# Patient Record
Sex: MALE | Race: WHITE | NOT HISPANIC OR LATINO | Employment: FULL TIME | URBAN - METROPOLITAN AREA
[De-identification: names, ages, dates, MRNs, and addresses within clinical notes are randomized per-mention and may not be internally consistent; named-entity substitution may affect disease eponyms.]

---

## 2021-01-28 ENCOUNTER — HOSPITAL ENCOUNTER (EMERGENCY)
Facility: HOSPITAL | Age: 56
End: 2021-01-29
Attending: EMERGENCY MEDICINE | Admitting: EMERGENCY MEDICINE
Payer: COMMERCIAL

## 2021-01-28 ENCOUNTER — APPOINTMENT (EMERGENCY)
Dept: CT IMAGING | Facility: HOSPITAL | Age: 56
End: 2021-01-28
Payer: COMMERCIAL

## 2021-01-28 DIAGNOSIS — I25.9 CARDIAC ISCHEMIA: ICD-10-CM

## 2021-01-28 DIAGNOSIS — N17.9 AKI (ACUTE KIDNEY INJURY) (HCC): ICD-10-CM

## 2021-01-28 DIAGNOSIS — I26.99 BILATERAL PULMONARY EMBOLISM (HCC): Primary | ICD-10-CM

## 2021-01-28 DIAGNOSIS — D72.829 LEUKOCYTOSIS: ICD-10-CM

## 2021-01-28 DIAGNOSIS — R77.8 ELEVATED TROPONIN: ICD-10-CM

## 2021-01-28 LAB
ALBUMIN SERPL BCP-MCNC: 4.3 G/DL (ref 3.4–4.8)
ALP SERPL-CCNC: 84.7 U/L (ref 10–129)
ALT SERPL W P-5'-P-CCNC: 14 U/L (ref 5–63)
ANION GAP SERPL CALCULATED.3IONS-SCNC: 15 MMOL/L (ref 4–13)
APTT PPP: 26 SECONDS (ref 23–31)
AST SERPL W P-5'-P-CCNC: 16 U/L (ref 15–41)
BASOPHILS # BLD AUTO: 0.01 THOUSANDS/ΜL (ref 0–0.1)
BASOPHILS NFR BLD AUTO: 0 % (ref 0–1)
BILIRUB SERPL-MCNC: 1.15 MG/DL (ref 0.3–1.2)
BUN SERPL-MCNC: 19 MG/DL (ref 6–20)
CALCIUM SERPL-MCNC: 10.1 MG/DL (ref 8.4–10.2)
CHLORIDE SERPL-SCNC: 103 MMOL/L (ref 96–108)
CO2 SERPL-SCNC: 22 MMOL/L (ref 22–33)
CREAT SERPL-MCNC: 1.65 MG/DL (ref 0.5–1.2)
D DIMER PPP FEU-MCNC: 10.68 MG/L FEU (ref 0.19–0.49)
EOSINOPHIL # BLD AUTO: 0.05 THOUSAND/ΜL (ref 0–0.61)
EOSINOPHIL NFR BLD AUTO: 0 % (ref 0–6)
ERYTHROCYTE [DISTWIDTH] IN BLOOD BY AUTOMATED COUNT: 13.9 % (ref 11.6–15.1)
FLUAV RNA RESP QL NAA+PROBE: NEGATIVE
FLUBV RNA RESP QL NAA+PROBE: NEGATIVE
GFR SERPL CREATININE-BSD FRML MDRD: 45 ML/MIN/1.73SQ M
GLUCOSE SERPL-MCNC: 202 MG/DL (ref 65–140)
HCT VFR BLD AUTO: 47.4 % (ref 36.5–49.3)
HGB BLD-MCNC: 15.7 G/DL (ref 12–17)
IMM GRANULOCYTES # BLD AUTO: 0.05 THOUSAND/UL (ref 0–0.2)
IMM GRANULOCYTES NFR BLD AUTO: 0 % (ref 0–2)
LACTATE SERPL-SCNC: 2.1 MMOL/L (ref 0–2)
LACTATE SERPL-SCNC: 2.3 MMOL/L (ref 0–2)
LYMPHOCYTES # BLD AUTO: 2.18 THOUSANDS/ΜL (ref 0.6–4.47)
LYMPHOCYTES NFR BLD AUTO: 12 % (ref 14–44)
MAGNESIUM SERPL-MCNC: 2.2 MG/DL (ref 1.6–2.6)
MCH RBC QN AUTO: 28.3 PG (ref 26.8–34.3)
MCHC RBC AUTO-ENTMCNC: 33.1 G/DL (ref 31.4–37.4)
MCV RBC AUTO: 86 FL (ref 82–98)
MONOCYTES # BLD AUTO: 1.05 THOUSAND/ΜL (ref 0.17–1.22)
MONOCYTES NFR BLD AUTO: 6 % (ref 4–12)
NEUTROPHILS # BLD AUTO: 15.08 THOUSANDS/ΜL (ref 1.85–7.62)
NEUTS SEG NFR BLD AUTO: 82 % (ref 43–75)
PLATELET # BLD AUTO: 228 THOUSANDS/UL (ref 149–390)
PMV BLD AUTO: 10.3 FL (ref 8.9–12.7)
POTASSIUM SERPL-SCNC: 3.9 MMOL/L (ref 3.5–5)
PROT SERPL-MCNC: 8 G/DL (ref 6.4–8.3)
RBC # BLD AUTO: 5.54 MILLION/UL (ref 3.88–5.62)
RSV RNA RESP QL NAA+PROBE: NEGATIVE
SARS-COV-2 RNA RESP QL NAA+PROBE: NEGATIVE
SODIUM SERPL-SCNC: 140 MMOL/L (ref 133–145)
TROPONIN I SERPL-MCNC: 0.22 NG/ML (ref 0–0.07)
WBC # BLD AUTO: 18.42 THOUSAND/UL (ref 4.31–10.16)

## 2021-01-28 PROCEDURE — 71275 CT ANGIOGRAPHY CHEST: CPT

## 2021-01-28 PROCEDURE — 36415 COLL VENOUS BLD VENIPUNCTURE: CPT | Performed by: EMERGENCY MEDICINE

## 2021-01-28 PROCEDURE — 85379 FIBRIN DEGRADATION QUANT: CPT | Performed by: EMERGENCY MEDICINE

## 2021-01-28 PROCEDURE — 83605 ASSAY OF LACTIC ACID: CPT | Performed by: EMERGENCY MEDICINE

## 2021-01-28 PROCEDURE — 84484 ASSAY OF TROPONIN QUANT: CPT | Performed by: EMERGENCY MEDICINE

## 2021-01-28 PROCEDURE — 85730 THROMBOPLASTIN TIME PARTIAL: CPT | Performed by: EMERGENCY MEDICINE

## 2021-01-28 PROCEDURE — 93005 ELECTROCARDIOGRAM TRACING: CPT

## 2021-01-28 PROCEDURE — 80053 COMPREHEN METABOLIC PANEL: CPT | Performed by: EMERGENCY MEDICINE

## 2021-01-28 PROCEDURE — 85025 COMPLETE CBC W/AUTO DIFF WBC: CPT | Performed by: EMERGENCY MEDICINE

## 2021-01-28 PROCEDURE — 99285 EMERGENCY DEPT VISIT HI MDM: CPT

## 2021-01-28 PROCEDURE — 83735 ASSAY OF MAGNESIUM: CPT | Performed by: EMERGENCY MEDICINE

## 2021-01-28 PROCEDURE — 87040 BLOOD CULTURE FOR BACTERIA: CPT | Performed by: EMERGENCY MEDICINE

## 2021-01-28 PROCEDURE — 0241U HB NFCT DS VIR RESP RNA 4 TRGT: CPT | Performed by: EMERGENCY MEDICINE

## 2021-01-28 PROCEDURE — 99285 EMERGENCY DEPT VISIT HI MDM: CPT | Performed by: EMERGENCY MEDICINE

## 2021-01-28 PROCEDURE — 83036 HEMOGLOBIN GLYCOSYLATED A1C: CPT | Performed by: EMERGENCY MEDICINE

## 2021-01-28 PROCEDURE — 96361 HYDRATE IV INFUSION ADD-ON: CPT

## 2021-01-28 PROCEDURE — 96365 THER/PROPH/DIAG IV INF INIT: CPT

## 2021-01-28 RX ORDER — HEPARIN SODIUM 1000 [USP'U]/ML
4200 INJECTION, SOLUTION INTRAVENOUS; SUBCUTANEOUS
Status: DISCONTINUED | OUTPATIENT
Start: 2021-01-28 | End: 2021-01-29 | Stop reason: HOSPADM

## 2021-01-28 RX ORDER — HEPARIN SODIUM 1000 [USP'U]/ML
8000 INJECTION, SOLUTION INTRAVENOUS; SUBCUTANEOUS ONCE
Status: DISCONTINUED | OUTPATIENT
Start: 2021-01-28 | End: 2021-01-28

## 2021-01-28 RX ORDER — HEPARIN SODIUM 10000 [USP'U]/100ML
3-30 INJECTION, SOLUTION INTRAVENOUS
Status: DISCONTINUED | OUTPATIENT
Start: 2021-01-28 | End: 2021-01-29 | Stop reason: HOSPADM

## 2021-01-28 RX ORDER — HEPARIN SODIUM 1000 [USP'U]/ML
8400 INJECTION, SOLUTION INTRAVENOUS; SUBCUTANEOUS ONCE
Status: COMPLETED | OUTPATIENT
Start: 2021-01-28 | End: 2021-01-29

## 2021-01-28 RX ORDER — HEPARIN SODIUM 1000 [USP'U]/ML
8000 INJECTION, SOLUTION INTRAVENOUS; SUBCUTANEOUS
Status: DISCONTINUED | OUTPATIENT
Start: 2021-01-28 | End: 2021-01-28

## 2021-01-28 RX ORDER — HEPARIN SODIUM 1000 [USP'U]/ML
8400 INJECTION, SOLUTION INTRAVENOUS; SUBCUTANEOUS
Status: DISCONTINUED | OUTPATIENT
Start: 2021-01-28 | End: 2021-01-29 | Stop reason: HOSPADM

## 2021-01-28 RX ORDER — KETOROLAC TROMETHAMINE 30 MG/ML
30 INJECTION, SOLUTION INTRAMUSCULAR; INTRAVENOUS ONCE
Status: DISCONTINUED | OUTPATIENT
Start: 2021-01-28 | End: 2021-01-29 | Stop reason: HOSPADM

## 2021-01-28 RX ORDER — HEPARIN SODIUM 10000 [USP'U]/100ML
3-30 INJECTION, SOLUTION INTRAVENOUS
Status: DISCONTINUED | OUTPATIENT
Start: 2021-01-28 | End: 2021-01-28

## 2021-01-28 RX ORDER — CEFTRIAXONE 1 G/50ML
1000 INJECTION, SOLUTION INTRAVENOUS ONCE
Status: COMPLETED | OUTPATIENT
Start: 2021-01-28 | End: 2021-01-28

## 2021-01-28 RX ORDER — HEPARIN SODIUM 1000 [USP'U]/ML
4000 INJECTION, SOLUTION INTRAVENOUS; SUBCUTANEOUS
Status: DISCONTINUED | OUTPATIENT
Start: 2021-01-28 | End: 2021-01-28

## 2021-01-28 RX ADMIN — SODIUM CHLORIDE 2060 ML: 0.9 INJECTION, SOLUTION INTRAVENOUS at 20:21

## 2021-01-28 RX ADMIN — CEFTRIAXONE 1000 MG: 1 INJECTION, SOLUTION INTRAVENOUS at 20:36

## 2021-01-28 RX ADMIN — IOHEXOL 100 ML: 350 INJECTION, SOLUTION INTRAVENOUS at 21:50

## 2021-01-28 RX ADMIN — SODIUM CHLORIDE 1000 ML: 0.9 INJECTION, SOLUTION INTRAVENOUS at 19:46

## 2021-01-29 ENCOUNTER — HOSPITAL ENCOUNTER (INPATIENT)
Facility: HOSPITAL | Age: 56
LOS: 2 days | Discharge: HOME | DRG: 175 | End: 2021-01-31
Attending: INTERNAL MEDICINE | Admitting: INTERNAL MEDICINE
Payer: COMMERCIAL

## 2021-01-29 ENCOUNTER — APPOINTMENT (INPATIENT)
Dept: NON INVASIVE DIAGNOSTICS | Facility: HOSPITAL | Age: 56
DRG: 175 | End: 2021-01-29
Payer: COMMERCIAL

## 2021-01-29 VITALS
SYSTOLIC BLOOD PRESSURE: 127 MMHG | WEIGHT: 232 LBS | OXYGEN SATURATION: 98 % | DIASTOLIC BLOOD PRESSURE: 85 MMHG | HEART RATE: 91 BPM | RESPIRATION RATE: 18 BRPM | TEMPERATURE: 97.5 F

## 2021-01-29 DIAGNOSIS — I26.99 BILATERAL PULMONARY EMBOLISM (HCC): Primary | ICD-10-CM

## 2021-01-29 PROBLEM — R73.9 HYPERGLYCEMIA: Status: ACTIVE | Noted: 2021-01-29

## 2021-01-29 PROBLEM — R77.8 ELEVATED TROPONIN: Status: ACTIVE | Noted: 2021-01-29

## 2021-01-29 PROBLEM — N17.9 AKI (ACUTE KIDNEY INJURY) (HCC): Status: ACTIVE | Noted: 2021-01-29

## 2021-01-29 PROBLEM — I25.9 CARDIAC ISCHEMIA: Status: ACTIVE | Noted: 2021-01-29

## 2021-01-29 LAB
APTT PPP: 117 SECONDS (ref 23–37)
APTT PPP: >210 SECONDS (ref 23–37)
ATRIAL RATE: 92 BPM
ERYTHROCYTE [DISTWIDTH] IN BLOOD BY AUTOMATED COUNT: 13.9 % (ref 11.6–15.1)
EST. AVERAGE GLUCOSE BLD GHB EST-MCNC: 134 MG/DL
EST. AVERAGE GLUCOSE BLD GHB EST-MCNC: 134 MG/DL
HBA1C MFR BLD: 6.3 %
HBA1C MFR BLD: 6.3 %
HCT VFR BLD AUTO: 44.5 % (ref 36.5–49.3)
HGB BLD-MCNC: 14.4 G/DL (ref 12–17)
INR PPP: 1.35 (ref 0.84–1.19)
LACTATE SERPL-SCNC: 2.1 MMOL/L (ref 0.5–2)
LACTATE SERPL-SCNC: 2.7 MMOL/L (ref 0.5–2)
MCH RBC QN AUTO: 28.5 PG (ref 26.8–34.3)
MCHC RBC AUTO-ENTMCNC: 32.4 G/DL (ref 31.4–37.4)
MCV RBC AUTO: 88 FL (ref 82–98)
NT-PROBNP SERPL-MCNC: 7113 PG/ML
P AXIS: 76 DEGREES
PLATELET # BLD AUTO: 183 THOUSANDS/UL (ref 149–390)
PMV BLD AUTO: 10.2 FL (ref 8.9–12.7)
PR INTERVAL: 138 MS
PROTHROMBIN TIME: 16.4 SECONDS (ref 11.6–14.5)
QRS AXIS: 53 DEGREES
QRSD INTERVAL: 92 MS
QT INTERVAL: 404 MS
QTC INTERVAL: 500 MS
RBC # BLD AUTO: 5.06 MILLION/UL (ref 3.88–5.62)
T WAVE AXIS: -28 DEGREES
TROPONIN I SERPL-MCNC: 0.17 NG/ML
TROPONIN I SERPL-MCNC: 0.26 NG/ML
TROPONIN I SERPL-MCNC: 0.32 NG/ML
VENTRICULAR RATE: 92 BPM
WBC # BLD AUTO: 15.17 THOUSAND/UL (ref 4.31–10.16)

## 2021-01-29 PROCEDURE — 87040 BLOOD CULTURE FOR BACTERIA: CPT | Performed by: NURSE PRACTITIONER

## 2021-01-29 PROCEDURE — 99223 1ST HOSP IP/OBS HIGH 75: CPT | Performed by: INTERNAL MEDICINE

## 2021-01-29 PROCEDURE — 93306 TTE W/DOPPLER COMPLETE: CPT

## 2021-01-29 PROCEDURE — 85730 THROMBOPLASTIN TIME PARTIAL: CPT | Performed by: INTERNAL MEDICINE

## 2021-01-29 PROCEDURE — 96367 TX/PROPH/DG ADDL SEQ IV INF: CPT

## 2021-01-29 PROCEDURE — 85610 PROTHROMBIN TIME: CPT | Performed by: NURSE PRACTITIONER

## 2021-01-29 PROCEDURE — 96366 THER/PROPH/DIAG IV INF ADDON: CPT

## 2021-01-29 PROCEDURE — 83880 ASSAY OF NATRIURETIC PEPTIDE: CPT | Performed by: NURSE PRACTITIONER

## 2021-01-29 PROCEDURE — 83036 HEMOGLOBIN GLYCOSYLATED A1C: CPT | Performed by: NURSE PRACTITIONER

## 2021-01-29 PROCEDURE — 93306 TTE W/DOPPLER COMPLETE: CPT | Performed by: INTERNAL MEDICINE

## 2021-01-29 PROCEDURE — 93010 ELECTROCARDIOGRAM REPORT: CPT | Performed by: INTERNAL MEDICINE

## 2021-01-29 PROCEDURE — 85730 THROMBOPLASTIN TIME PARTIAL: CPT | Performed by: NURSE PRACTITIONER

## 2021-01-29 PROCEDURE — 96375 TX/PRO/DX INJ NEW DRUG ADDON: CPT

## 2021-01-29 PROCEDURE — 85027 COMPLETE CBC AUTOMATED: CPT | Performed by: NURSE PRACTITIONER

## 2021-01-29 PROCEDURE — 83605 ASSAY OF LACTIC ACID: CPT | Performed by: NURSE PRACTITIONER

## 2021-01-29 PROCEDURE — 93005 ELECTROCARDIOGRAM TRACING: CPT

## 2021-01-29 PROCEDURE — 84484 ASSAY OF TROPONIN QUANT: CPT | Performed by: NURSE PRACTITIONER

## 2021-01-29 RX ORDER — HEPARIN SODIUM 1000 [USP'U]/ML
4200 INJECTION, SOLUTION INTRAVENOUS; SUBCUTANEOUS
Status: DISCONTINUED | OUTPATIENT
Start: 2021-01-29 | End: 2021-01-29

## 2021-01-29 RX ORDER — HEPARIN SODIUM 1000 [USP'U]/ML
4000 INJECTION, SOLUTION INTRAVENOUS; SUBCUTANEOUS
Status: DISCONTINUED | OUTPATIENT
Start: 2021-01-29 | End: 2021-01-30

## 2021-01-29 RX ORDER — HEPARIN SODIUM 1000 [USP'U]/ML
8000 INJECTION, SOLUTION INTRAVENOUS; SUBCUTANEOUS ONCE
Status: DISCONTINUED | OUTPATIENT
Start: 2021-01-29 | End: 2021-01-30

## 2021-01-29 RX ORDER — HEPARIN SODIUM 1000 [USP'U]/ML
8400 INJECTION, SOLUTION INTRAVENOUS; SUBCUTANEOUS
Status: DISCONTINUED | OUTPATIENT
Start: 2021-01-29 | End: 2021-01-29

## 2021-01-29 RX ORDER — HEPARIN SODIUM 1000 [USP'U]/ML
8000 INJECTION, SOLUTION INTRAVENOUS; SUBCUTANEOUS
Status: DISCONTINUED | OUTPATIENT
Start: 2021-01-29 | End: 2021-01-30

## 2021-01-29 RX ORDER — HEPARIN SODIUM 1000 [USP'U]/ML
8400 INJECTION, SOLUTION INTRAVENOUS; SUBCUTANEOUS ONCE
Status: DISCONTINUED | OUTPATIENT
Start: 2021-01-29 | End: 2021-01-29

## 2021-01-29 RX ORDER — CHLORHEXIDINE GLUCONATE 0.12 MG/ML
15 RINSE ORAL EVERY 12 HOURS SCHEDULED
Status: DISCONTINUED | OUTPATIENT
Start: 2021-01-29 | End: 2021-01-29

## 2021-01-29 RX ORDER — HEPARIN SODIUM 10000 [USP'U]/100ML
3-30 INJECTION, SOLUTION INTRAVENOUS
Status: DISCONTINUED | OUTPATIENT
Start: 2021-01-29 | End: 2021-01-30

## 2021-01-29 RX ORDER — HEPARIN SODIUM 10000 [USP'U]/100ML
3-30 INJECTION, SOLUTION INTRAVENOUS
Status: DISCONTINUED | OUTPATIENT
Start: 2021-01-29 | End: 2021-01-29

## 2021-01-29 RX ADMIN — HEPARIN SODIUM 18 UNITS/KG/HR: 10000 INJECTION, SOLUTION INTRAVENOUS at 03:30

## 2021-01-29 RX ADMIN — HEPARIN SODIUM 18 UNITS/KG/HR: 10000 INJECTION, SOLUTION INTRAVENOUS at 00:16

## 2021-01-29 RX ADMIN — HEPARIN SODIUM 8400 UNITS: 1000 INJECTION INTRAVENOUS; SUBCUTANEOUS at 00:20

## 2021-01-29 RX ADMIN — PERFLUTREN 0.4 ML/MIN: 6.52 INJECTION, SUSPENSION INTRAVENOUS at 14:58

## 2021-01-29 NOTE — ED PROCEDURE NOTE
PROCEDURE  CriticalCare Time  Performed by: Jesusita Dean MD  Authorized by:  Jesusita Dena MD     Critical care provider statement:     Critical care time (minutes):  64    Critical care start time:  1/28/2021 7:10 PM    Critical care end time:  1/29/2021 1:45 AM    Critical care time was exclusive of:  Separately billable procedures and treating other patients    Critical care was necessary to treat or prevent imminent or life-threatening deterioration of the following conditions:  Respiratory failure and circulatory failure (bilateral large PEs with right heart straina nd elevated trop)    Critical care was time spent personally by me on the following activities:  Obtaining history from patient or surrogate, development of treatment plan with patient or surrogate, discussions with consultants, evaluation of patient's response to treatment, examination of patient, ordering and performing treatments and interventions, ordering and review of laboratory studies, ordering and review of radiographic studies and re-evaluation of patient's condition    I assumed direction of critical care for this patient from another provider in my specialty: no           Jesusita Dean MD  01/29/21 6074

## 2021-01-29 NOTE — ED PROCEDURE NOTE
PROCEDURE  ECG 12 Lead Documentation Only    Date/Time: 1/28/2021 11:50 PM  Performed by: Agustin Macedo MD  Authorized by:  Agustin Macedo MD     Indications / Diagnosis:  Pulomary emboli  ECG reviewed by me, the ED Provider: yes    Patient location:  ED and bedside  Previous ECG:     Previous ECG:  Unavailable    Comparison to cardiac monitor: Yes    Interpretation:     Interpretation: abnormal    Rate:     ECG rate:  89    ECG rate assessment: normal    Rhythm:     Rhythm: sinus rhythm    Ectopy:     Ectopy: none    QRS:     QRS axis:  Normal    QRS intervals:  Normal  Conduction:     Conduction: normal    ST segments:     ST segments:  Normal  T waves:     T waves: inverted      Inverted:  II, III, aVF, V1, V2, V3, V4, V5 and V6  Comments:      Ischemia in lateral leads         Agustin Macedo MD  01/29/21 7131

## 2021-01-29 NOTE — ED PROVIDER NOTES
History  Chief Complaint   Patient presents with    Shortness of Breath     sob since yesterday, "cold sweats" last night  cough that started this morning  denies fever  +covid exposure  This is a 61year old male that ambulated to the ED reporting that he has had several positive exposures in the past several weeks at work - he developed a dry cough yesterday with shortness of breath that began today  - denies wheezing or chest pain  Denies fever   Did experience night sweats and myalgias  last evening  Denies loss of taste or smell however has had a decreased appetite for the past 3 days  Also reports that he has not been drinking adequate fluids for the past 3 days    Smooth hx of asthma or COPD - denies hx of smoking  Later in the visit and after diagnosis, pt informed me that he became dizzy when he stood to transfer to the CT table and also that he did not go to work today due to his shortness of breath and that he had been experiencing palpitations intermittently throughout the day          Prior to Admission Medications   Prescriptions Last Dose Informant Patient Reported? Taking? Ascorbic Acid (VITAMIN C PO)   Yes Yes   Sig: Take by mouth   VITAMIN D PO   Yes Yes   Sig: Take by mouth      Facility-Administered Medications: None       History reviewed  No pertinent past medical history  History reviewed  No pertinent surgical history  History reviewed  No pertinent family history  I have reviewed and agree with the history as documented  E-Cigarette/Vaping     E-Cigarette/Vaping Substances     Social History     Tobacco Use    Smoking status: Never Smoker   Substance Use Topics    Alcohol use: Yes     Comment: occasionally    Drug use: Never       Review of Systems   Constitutional: Negative for activity change, appetite change, chills, diaphoresis, fatigue, fever and unexpected weight change     HENT: Negative for congestion, ear discharge, ear pain, mouth sores, sinus pressure, sinus pain, sneezing, sore throat, trouble swallowing and voice change  Eyes: Negative for photophobia, pain, discharge, redness, itching and visual disturbance  Respiratory: Positive for cough and shortness of breath  Negative for chest tightness  Cardiovascular: Negative for chest pain, palpitations and leg swelling  Gastrointestinal: Negative for abdominal pain, constipation, nausea and vomiting  Endocrine: Negative for cold intolerance, heat intolerance, polydipsia, polyphagia and polyuria  Genitourinary: Negative for decreased urine volume, difficulty urinating, dysuria, frequency, hematuria and urgency  Musculoskeletal: Positive for myalgias  Negative for arthralgias, back pain, gait problem, joint swelling, neck pain and neck stiffness  Skin: Negative for color change and rash  Allergic/Immunologic: Negative for immunocompromised state  Neurological: Negative for dizziness, tremors, seizures, syncope, speech difficulty, weakness, light-headedness, numbness and headaches  Hematological: Does not bruise/bleed easily  Psychiatric/Behavioral: Negative for behavioral problems and suicidal ideas  Physical Exam  Physical Exam  Vitals signs and nursing note reviewed  Constitutional:       General: He is not in acute distress  Appearance: Normal appearance  He is well-developed and normal weight  He is not ill-appearing, toxic-appearing or diaphoretic  HENT:      Head: Normocephalic and atraumatic  Right Ear: Tympanic membrane, ear canal and external ear normal  There is no impacted cerumen  Left Ear: Tympanic membrane, ear canal and external ear normal  There is no impacted cerumen  Nose: No congestion or rhinorrhea  Mouth/Throat:      Mouth: Mucous membranes are moist       Pharynx: Oropharynx is clear  No oropharyngeal exudate or posterior oropharyngeal erythema  Eyes:      General: No scleral icterus  Right eye: No discharge           Left eye: No discharge  Extraocular Movements: Extraocular movements intact  Conjunctiva/sclera: Conjunctivae normal       Pupils: Pupils are equal, round, and reactive to light  Neck:      Musculoskeletal: Normal range of motion and neck supple  No neck rigidity or muscular tenderness  Vascular: No JVD  Trachea: No tracheal deviation  Cardiovascular:      Rate and Rhythm: Regular rhythm  Tachycardia present  Heart sounds: Normal heart sounds  No murmur  Pulmonary:      Effort: Pulmonary effort is normal  Tachypnea present  No respiratory distress  Breath sounds: Examination of the right-upper field reveals decreased breath sounds  Examination of the left-upper field reveals decreased breath sounds  Examination of the right-middle field reveals decreased breath sounds  Examination of the left-middle field reveals decreased breath sounds  Examination of the right-lower field reveals decreased breath sounds  Examination of the left-lower field reveals decreased breath sounds  Decreased breath sounds present  No wheezing or rales  Chest:      Chest wall: No tenderness  Abdominal:      General: Bowel sounds are normal       Palpations: Abdomen is soft  There is no mass  Tenderness: There is no abdominal tenderness  There is no right CVA tenderness, left CVA tenderness or guarding  Hernia: No hernia is present  Musculoskeletal: Normal range of motion  General: No swelling, tenderness, deformity or signs of injury  Right lower leg: No edema  Left lower leg: No edema  Lymphadenopathy:      Cervical: No cervical adenopathy  Skin:     General: Skin is warm and dry  Capillary Refill: Capillary refill takes less than 2 seconds  Findings: No bruising, erythema or rash  Neurological:      General: No focal deficit present  Mental Status: He is alert and oriented to person, place, and time  Mental status is at baseline        Cranial Nerves: No cranial nerve deficit  Sensory: No sensory deficit  Motor: No weakness or abnormal muscle tone  Coordination: Coordination normal       Gait: Gait normal       Deep Tendon Reflexes: Reflexes normal    Psychiatric:         Mood and Affect: Mood normal          Behavior: Behavior normal          Thought Content:  Thought content normal          Judgment: Judgment normal          Vital Signs  ED Triage Vitals   Temperature Pulse Respirations Blood Pressure SpO2   01/28/21 1918 01/28/21 1915 01/28/21 1915 01/28/21 1915 01/28/21 1915   97 5 °F (36 4 °C) (!) 110 18 130/84 98 %      Temp Source Heart Rate Source Patient Position - Orthostatic VS BP Location FiO2 (%)   01/28/21 1918 01/28/21 1915 01/28/21 1915 01/28/21 1915 --   Oral Monitor Lying Right arm       Pain Score       01/28/21 2038       No Pain           Vitals:    01/28/21 1915 01/28/21 2038 01/28/21 2233 01/29/21 0022   BP: 130/84 109/74 126/84 103/74   Pulse: (!) 110 93 100 91   Patient Position - Orthostatic VS: Lying Lying Lying Lying         Visual Acuity      ED Medications  Medications   ketorolac (TORADOL) injection 30 mg (30 mg Intravenous Not Given 1/28/21 1948)   heparin (porcine) 25,000 units in 0 45% NaCl 250 mL infusion (premix) (18 Units/kg/hr × 105 kg (Order-Specific) Intravenous New Bag 1/29/21 0016)   heparin (porcine) injection 8,400 Units (has no administration in time range)   heparin (porcine) injection 4,200 Units (has no administration in time range)   sodium chloride 0 9 % bolus 1,000 mL (0 mL Intravenous Stopped 1/28/21 2138)   sodium chloride 0 9 % bolus 2,060 mL (0 mL Intravenous Stopped 1/28/21 2234)   cefTRIAXone (ROCEPHIN) IVPB (premix in dextrose) 1,000 mg 50 mL (0 mg Intravenous Stopped 1/28/21 2138)   iohexol (OMNIPAQUE) 350 MG/ML injection (SINGLE-DOSE) 100 mL (100 mL Intravenous Given 1/28/21 2150)   heparin (porcine) injection 8,400 Units (8,400 Units Intravenous Given 1/29/21 0020)       Diagnostic Studies  Results Reviewed     Procedure Component Value Units Date/Time    Hemoglobin A1C [[id number]] Collected: 01/28/21 1947    Lab Status: In process Specimen: Blood from Arm, Right Updated: 01/29/21 0056    Blood culture #1 [[id number]] Collected: 01/28/21 2029    Lab Status: Preliminary result Specimen: Blood from Arm, Left Updated: 01/29/21 0002     Blood Culture Received in Microbiology Lab  Culture in Progress  Blood culture #2 [[id number]] Collected: 01/28/21 2029    Lab Status: Preliminary result Specimen: Blood from Arm, Right Updated: 01/29/21 0002     Blood Culture Received in Microbiology Lab  Culture in Progress  Troponin I [[id number]]     Lab Status: No result Specimen: Blood     Troponin I [[id number]]  (Abnormal) Collected: 01/28/21 1947    Lab Status: Final result Specimen: Blood from Arm, Right Updated: 01/28/21 2341     Troponin I 0 22 ng/mL     APTT [[id number]]     Lab Status: No result Specimen: Blood     APTT [[id number]]  (Normal) Collected: 01/28/21 1947    Lab Status: Final result Specimen: Blood from Arm, Right Updated: 01/28/21 2320     PTT 26 seconds     Lactic acid 2 Hours [[id number]]  (Abnormal) Collected: 01/28/21 2231    Lab Status: Final result Specimen: Blood from Arm, Right Updated: 01/28/21 2255     LACTIC ACID 2 1 mmol/L     Narrative:      Result may be elevated if tourniquet was used during collection  Lactic acid, plasma [[id number]]  (Abnormal) Collected: 01/28/21 2029    Lab Status: Final result Specimen: Blood from Arm, Left Updated: 01/28/21 2057     LACTIC ACID 2 3 mmol/L     Narrative:      Result may be elevated if tourniquet was used during collection  COVID19, Influenza A/B, RSV PCR, [facility] [[id number]]  (Normal) Collected: 01/28/21 1947    Lab Status: Final result Specimen: Nasopharyngeal Swab Updated: 01/28/21 2044     SARS-CoV-2 Negative     INFLUENZA A PCR Negative     INFLUENZA B PCR Negative     RSV PCR Negative    Narrative:        This test has been authorized by FDA under an EUA (Emergency Use Assay) for use by authorized laboratories  Clinical caution and judgement should be used with the interpretation of these results with consideration of the clinical impression and other laboratory testing  Testing reported as "Positive" or "Negative" has been proven to be accurate according to standard laboratory validation requirements  All testing is performed with control materials showing appropriate reactivity at standard intervals      D-dimer, quantitative [[id number]]  (Abnormal) Collected: 01/28/21 1947    Lab Status: Final result Specimen: Blood from Arm, Right Updated: 01/28/21 2035     D-Dimer, Quant  10 68 mg/L FEU     Comprehensive metabolic panel [[id number]]  (Abnormal) Collected: 01/28/21 1947    Lab Status: Final result Specimen: Blood from Arm, Right Updated: 01/28/21 2014     Sodium 140 mmol/L      Potassium 3 9 mmol/L      Chloride 103 mmol/L      CO2 22 mmol/L      ANION GAP 15 mmol/L      BUN 19 mg/dL      Creatinine 1 65 mg/dL      Glucose 202 mg/dL      Calcium 10 1 mg/dL      AST 16 U/L      ALT 14 U/L      Alkaline Phosphatase 84 7 U/L      Total Protein 8 0 g/dL      Albumin 4 3 g/dL      Total Bilirubin 1 15 mg/dL      eGFR 45 ml/min/1 73sq m     Narrative:      [place] guidelines for Chronic Kidney Disease (CKD):     Stage 1 with normal or high GFR (GFR > 90 mL/min/1 73 square meters)    Stage 2 Mild CKD (GFR = 60-89 mL/min/1 73 square meters)    Stage 3A Moderate CKD (GFR = 45-59 mL/min/1 73 square meters)    Stage 3B Moderate CKD (GFR = 30-44 mL/min/1 73 square meters)    Stage 4 Severe CKD (GFR = 15-29 mL/min/1 73 square meters)    Stage 5 End Stage CKD (GFR <15 mL/min/1 73 square meters)  Note: GFR calculation is accurate only with a steady state creatinine    Magnesium [[id number]]  (Normal) Collected: 01/28/21 1947    Lab Status: Final result Specimen: Blood from Arm, Right Updated: 01/28/21 2014     Magnesium 2 2 mg/dL CBC and differential [[id number]]  (Abnormal) Collected: 01/28/21 1947    Lab Status: Final result Specimen: Blood from Arm, Right Updated: 01/28/21 2000     WBC 18 42 Thousand/uL      RBC 5 54 Million/uL      Hemoglobin 15 7 g/dL      Hematocrit 47 4 %      MCV 86 fL      MCH 28 3 pg      MCHC 33 1 g/dL      RDW 13 9 %      MPV 10 3 fL      Platelets 742 Thousands/uL      Neutrophils Relative 82 %      Immat GRANS % 0 %      Lymphocytes Relative 12 %      Monocytes Relative 6 %      Eosinophils Relative 0 %      Basophils Relative 0 %      Neutrophils Absolute 15 08 Thousands/µL      Immature Grans Absolute 0 05 Thousand/uL      Lymphocytes Absolute 2 18 Thousands/µL      Monocytes Absolute 1 05 Thousand/µL      Eosinophils Absolute 0 05 Thousand/µL      Basophils Absolute 0 01 Thousands/µL                  CTA ED chest PE Study   Final Result by Marciano Ellis MD (01/28 2257)      Extensive bilateral upper and lower lobar pulmonary emboli  Right ventricle is dilated consistent with right heart strain  I personally discussed this study with REJI Pimentel on 1/28/2021 at 10:56 PM                Workstation performed: [id number]                    Procedures  Procedures         ED Course  ED Course as of Jan 29 0152   Thu Jan 28, 2021   2300 Received a call from radiologist advising that pt has bilateral extensive PEs with right heart strain 0-ordered heparin drip        Fri Jan 29, 2021   0027 Through the assistance of Cristal Eng at [address] [place] [address] findings with Dr Salbador Meneses at [facility] - advised that pt is stable and may be transferred to [facility] ICU - awaiting call from ICU at [address] denies dizziness PTA however reports that when he stood up to transfer to the CT table, he became dizzy and "thought he may pass out"      267 This 78-year-old male presented to emergency department with complaints of shortness of breath and a dry cough that started yesterday  He has had substantial COVID contacts  Was concern for COVID, URI, pneumonia or PE due to his tachycardia and tachypnea  0291 Notified by radiologist of bilateral large Pes with right heart strain  Ischemia on the EKG with T wave inversions  Initial trop 0 22  Leukocytosis at 18 42 - triggered for sepsis with tachycardia and leukocytosis - initial lactate elevated at 2 3  Ordered septic calculated fluids and Rocephin for probable pulonary source  Heparin bolus and drip ordered after receiving CTA results  TANG with increased creatinine at 1 65  Glucose elevated at 202 - added A1C      0054 Pt not a diagnosed diabetic      0054 Discussed diagnosis with pt and wife at the bedside and there need to transfer to a higher level of care - verbalized agreement with the plan  Discussed with Dr Aristides Boo [facility] and Dr Oleg Rivera [facility] - pt accepted for stepdown transfer to [facility]  Portions of the record may have been created with voice recognition software  Occasional wrong word or "sound a like" substitutions may have occurred due to the inherent limitations of voice recognition software  Read the chart carefully and recognize, using context, where substitutions have occurred  0146 Spent 32 minutes in total discussing findings and treatment plans with patient and his wife  Discussed case and findings with intensivists from 2 different campuses on different calls    Received notification from Criselda at [facility] that pt will be picked up by [facility] at [address] for transport to the ICU at [facility] - pt and wifr advised as to the same      See sepsis reassessment                                  Default Flowsheet Data (last 720 hours)      Sepsis Reassess     Row Name 01/29/21 0142                   Repeat Volume Status and Tissue Perfusion Assessment Performed    Repeat Volume Status and Tissue Perfusion Assessment Performed  Yes  -TM           Volume Status and Tissue Perfusion Post Fluid Resuscitation * Must Document All *    Vital Signs Reviewed (HR, RR, BP, T)  Yes 103/74,97 5,91, 95% on room air  -TM        Shock Index Reviewed  Yes  -TM        Arterial Oxygen Saturation Reviewed (POx, SaO2 or SpO2)  Yes (comment %) 95  -TM        Cardio  Normal S1/S2; Regular rate and rhythm; No murmor; No rub or gallop  -TM        Pulmonary  Normal effort; Other (comment) decreased bilateral air movement  -TM        Capillary Refill  Brisk  -TM        Peripheral Pulses  Radial;Dorsalis Pedis; Posterior Tibialis  -TM        Peripheral Pulse  +2  -TM        Dorsalis Pedis  +2  -TM        Posterior Tibialis  +2  -TM        Skin  Warm;Dry;Normal  -TM        Urine output assessed  Adequate  -TM           *OR*   Intensive Monitoring- Must Document One of the Following Four *:    Vital Signs Reviewed          * Central Venous Pressure (CVP or RAP)          * Central Venous Oxygen (SVO2, ScvO2 or Oxygen saturation via central catheter)          * Bedside Cardiovascular US in IVC diameter and % collapse          * Passive Leg Raise OR Crystalloid Challenge            User Key  (r) = Recorded By, (t) = Taken By, (c) = Cosigned By    Initials Name Provider Type    TM Kole Butler MD Physician            Ed Chacon' Criteria for PE      Most Recent Value   Wells' Criteria for PE   Clinical signs and symptoms of DVT  0 Filed at: 01/28/2021 2049   PE is primary diagnosis or equally likely  3 Filed at: 01/28/2021 2049   HR >100  1 5 Filed at: 01/28/2021 2049   Immobilization at least 3 days or Surgery in the previous 4 weeks  1 5 Filed at: 01/28/2021 2049   Previous, objectively diagnosed PE or DVT  0 Filed at: 01/28/2021 2049   Hemoptysis  0 Filed at: 01/28/2021 2049   Malignancy with treatment within 6 months or palliative  0 Filed at: 01/28/2021 2049   Ed Chacon' Criteria Total  6 Filed at: 01/28/2021 2049                MDM  Number of Diagnoses or Management Options  Bilateral pulmonary embolism ([facility] [address] ): new and requires workup  Cardiac ischemia: new and requires workup  Elevated troponin: new and requires workup  Leukocytosis: new and requires workup  Diagnosis management comments: COVID,viral URI, bronchitis  PE,MI       Amount and/or Complexity of Data Reviewed  Clinical lab tests: ordered and reviewed  Tests in the radiology section of CPT®: ordered and reviewed  Obtain history from someone other than the patient: yes (Wife at bedside after negative COVID test)  Discuss the patient with other providers: yes (Radiologist, Dr Carrie Owens [facility])  Independent visualization of images, tracings, or specimens: yes    Risk of Complications, Morbidity, and/or Mortality  Presenting problems: high  Diagnostic procedures: moderate  Management options: high    Patient Progress  Patient progress: stable      Disposition  Final diagnoses:   Bilateral pulmonary embolism (HCC)   Leukocytosis   Elevated troponin   Cardiac ischemia   TANG (acute kidney injury) ([facility] [address] )     Time reflects when diagnosis was documented in both MDM as applicable and the Disposition within this note     Time User Action Codes Description Comment    1/28/2021 11:07 PM Geannie Mauro Add [I26 99] Bilateral pulmonary embolism ([facility] [address] )     1/28/2021 11:07 PM Geannie [place] Add [D72 829] Leukocytosis     1/29/2021 12:31 AM Geannie Mauro Add [R77 8] Elevated troponin     1/29/2021 12:32 AM Geannie [place] Add [I25 9] Cardiac ischemia     1/29/2021  1:03 AM Geannie [place] Add [N17 9] TANG (acute kidney injury) [facility])       ED Disposition     ED Disposition Condition Date/Time Comment    Transfer to Another Facility-In Network  Fri Jan 29, 2021  1:00 AM Annettefrederick Number should be transferred out to [facility]        MD Documentation      Most Recent Value   Patient Condition  The patient has been stabilized such that within reasonable medical probability, no material deterioration of the patient condition or the condition of the unborn child(lady) is likely to result from the transfer   Reason for Transfer  Level of Care needed not available at this facility   Benefits of Transfer  Specialized equipment and/or services available at the receiving facility (Include comment)________________________   Risks of Transfer  Potential for delay in receiving treatment   Accepting Physician  Dr Blas Shin Name, [facility]    (Name & Tel number)  Delilah Gonzalez -    Sending MD Dr Hood Campoverde   Provider Certification  General risk, such as traffic hazards, adverse weather conditions, rough terrain or turbulence, possible failure of equipment (including vehicle or aircraft), or consequences of actions of persons outside the control of the transport personnel, Unanticipated needs of medical equipment and personnel during transport, Risk of worsening condition, The possibility of a transport vehicle being unavailable      RN Documentation      Most Recent Value   Accepting Facility [facility], [facility]   Bed Assignment  ICU 14    (Name & Tel number)  [facility] -       Follow-up Information    None         Patient's Medications   Discharge Prescriptions    No medications on file     No discharge procedures on file      PDMP Review     None          ED Provider  Electronically Signed by           Rosas Blevins MD  01/29/21 3141

## 2021-01-29 NOTE — SEPSIS NOTE
Sepsis Note   Geovanny Franco 61 y o  male MRN: [id number]  Unit/Bed#: ED 06 Encounter: [id number]      qSOFA     [address] Name 01/29/21 0022 01/28/21 2233 01/28/21 2038 01/28/21 1923 01/28/21 1915    Altered mental status GCS < 15        0      Respiratory Rate > / =22  0  0  0    0    Systolic BP < / =058  0  0  0    0    Q Sofa Score  0  0  0  0  0                 Default Flowsheet Data (last 720 hours)      Sepsis Reassess     Row Name 01/29/21 0142                   Repeat Volume Status and Tissue Perfusion Assessment Performed    Repeat Volume Status and Tissue Perfusion Assessment Performed  Yes  -TM           Volume Status and Tissue Perfusion Post Fluid Resuscitation * Must Document All *    Vital Signs Reviewed (HR, RR, BP, T)  Yes 103/74,97 5,91, 95% on room air  -TM        Shock Index Reviewed  Yes  -TM        Arterial Oxygen Saturation Reviewed (POx, SaO2 or SpO2)  Yes (comment %) 95  -TM        Cardio  Normal S1/S2; Regular rate and rhythm; No murmor; No rub or gallop  -TM        Pulmonary  Normal effort; Other (comment) decreased bilateral air movement  -TM        Capillary Refill  Brisk  -TM        Peripheral Pulses  Radial;Dorsalis Pedis; Posterior Tibialis  -TM        Peripheral Pulse  +2  -TM        Dorsalis Pedis  +2  -TM        Posterior Tibialis  +2  -TM        Skin  Warm;Dry;Normal  -TM        Urine output assessed  Adequate  -TM           *OR*   Intensive Monitoring- Must Document One of the Following Four *:    Vital Signs Reviewed          * Central Venous Pressure (CVP or RAP)          * Central Venous Oxygen (SVO2, ScvO2 or Oxygen saturation via central catheter)          * Bedside Cardiovascular US in IVC diameter and % collapse          * Passive Leg Raise OR Crystalloid Challenge            User Key  (r) = Recorded By, (t) = Taken By, (c) = Cosigned By    Initials Name Provider Type    TM Luis Paris MD Physician

## 2021-01-29 NOTE — ASSESSMENT & PLAN NOTE
Likely secondary to pulmonary embolism  Will trend troponins  Patient is currently without chest pain

## 2021-01-29 NOTE — PLAN OF CARE
Problem: Potential for Falls  Goal: Patient will remain free of falls  Description: INTERVENTIONS:  - Assess patient frequently for physical needs  -  Identify cognitive and physical deficits and behaviors that affect risk of falls    -  [place] fall precautions as indicated by assessment   - Educate patient/family on patient safety including physical limitations  - Instruct patient to call for assistance with activity based on assessment  - Modify environment to reduce risk of injury  - Consider OT/PT consult to assist with strengthening/mobility  Outcome: Progressing     Problem: Prexisting or High Potential for Compromised Skin Integrity  Goal: Skin integrity is maintained or improved  Description: INTERVENTIONS:  - Identify patients at risk for skin breakdown  - Assess and monitor skin integrity  - Assess and monitor nutrition and hydration status  - Monitor labs   - Assess for incontinence   - Turn and reposition patient  - Assist with mobility/ambulation  - Relieve pressure over bony prominences  - Avoid friction and shearing  - Provide appropriate hygiene as needed including keeping skin clean and dry  - Evaluate need for skin moisturizer/barrier cream  - Collaborate with interdisciplinary team   - Patient/family teaching  - Consider wound care consult   Outcome: Progressing     Problem: NEUROSENSORY - ADULT  Goal: Achieves stable or improved neurological status  Description: INTERVENTIONS  - Monitor and report changes in neurological status  - Monitor vital signs such as temperature, blood pressure, glucose, and any other labs ordered   - Initiate measures to prevent increased intracranial pressure  - Monitor for seizure activity and implement precautions if appropriate      Outcome: Progressing  Goal: Achieves maximal functionality and self care  Description: INTERVENTIONS  - Monitor swallowing and airway patency with patient fatigue and changes in neurological status  - Encourage and assist patient to increase activity and self care     - Encourage visually impaired, hearing impaired and aphasic patients to use assistive/communication devices  Outcome: Progressing     Problem: CARDIOVASCULAR - ADULT  Goal: Maintains optimal cardiac output and hemodynamic stability  Description: INTERVENTIONS:  - Monitor I/O, vital signs and rhythm  - Monitor for S/S and trends of decreased cardiac output  - Administer and titrate ordered vasoactive medications to optimize hemodynamic stability  - Assess quality of pulses, skin color and temperature  - Assess for signs of decreased coronary artery perfusion  - Instruct patient to report change in severity of symptoms  Outcome: Not Progressing  Goal: Absence of cardiac dysrhythmias or at baseline rhythm  Description: INTERVENTIONS:  - Continuous cardiac monitoring, vital signs, obtain 12 lead EKG if ordered  - Administer antiarrhythmic and heart rate control medications as ordered  - Monitor electrolytes and administer replacement therapy as ordered  Outcome: Progressing     Problem: RESPIRATORY - ADULT  Goal: Achieves optimal ventilation and oxygenation  Description: INTERVENTIONS:  - Assess for changes in respiratory status  - Assess for changes in mentation and behavior  - Position to facilitate oxygenation and minimize respiratory effort  - Oxygen administered by appropriate delivery if ordered  - Initiate smoking cessation education as indicated  - Encourage broncho-pulmonary hygiene including cough, deep breathe, Incentive Spirometry  - Assess the need for suctioning and aspirate as needed  - Assess and instruct to report SOB or any respiratory difficulty  - Respiratory Therapy support as indicated  Outcome: Progressing     Problem: GASTROINTESTINAL - ADULT  Goal: Minimal or absence of nausea and/or vomiting  Description: INTERVENTIONS:  - Administer IV fluids if ordered to ensure adequate hydration  - Maintain NPO status until nausea and vomiting are resolved  - Nasogastric tube if ordered  - Administer ordered antiemetic medications as needed  - Provide nonpharmacologic comfort measures as appropriate  - Advance diet as tolerated, if ordered  - Consider nutrition services referral to assist patient with adequate nutrition and appropriate food choices  Outcome: Progressing  Goal: Maintains or returns to baseline bowel function  Description: INTERVENTIONS:  - Assess bowel function  - Encourage oral fluids to ensure adequate hydration  - Administer IV fluids if ordered to ensure adequate hydration  - Administer ordered medications as needed  - Encourage mobilization and activity  - Consider nutritional services referral to assist patient with adequate nutrition and appropriate food choices  Outcome: Progressing  Goal: Maintains adequate nutritional intake  Description: INTERVENTIONS:  - Monitor percentage of each meal consumed  - Identify factors contributing to decreased intake, treat as appropriate  - Assist with meals as needed  - Monitor I&O, weight, and lab values if indicated  - Obtain nutrition services referral as needed  Outcome: Progressing     Problem: GENITOURINARY - ADULT  Goal: Maintains or returns to baseline urinary function  Description: INTERVENTIONS:  - Assess urinary function  - Encourage oral fluids to ensure adequate hydration if ordered  - Administer IV fluids as ordered to ensure adequate hydration  - Administer ordered medications as needed  - Offer frequent toileting  - Follow urinary retention protocol if ordered  Outcome: Progressing  Goal: Absence of urinary retention  Description: INTERVENTIONS:  - Assess patients ability to void and empty bladder  - Monitor I/O  - Bladder scan as needed  - Discuss with physician/AP medications to alleviate retention as needed  - Discuss catheterization for long term situations as appropriate  Outcome: Progressing     Problem: METABOLIC, FLUID AND ELECTROLYTES - ADULT  Goal: Electrolytes maintained within normal limits  Description: INTERVENTIONS:  - Monitor labs and assess patient for signs and symptoms of electrolyte imbalances  - Administer electrolyte replacement as ordered  - Monitor response to electrolyte replacements, including repeat lab results as appropriate  - Instruct patient on fluid and nutrition as appropriate  Outcome: Progressing  Goal: Fluid balance maintained  Description: INTERVENTIONS:  - Monitor labs   - Monitor I/O and WT  - Instruct patient on fluid and nutrition as appropriate  - Assess for signs & symptoms of volume excess or deficit  Outcome: Progressing  Goal: Glucose maintained within target range  Description: INTERVENTIONS:  - Monitor Blood Glucose as ordered  - Assess for signs and symptoms of hyperglycemia and hypoglycemia  - Administer ordered medications to maintain glucose within target range  - Assess nutritional intake and initiate nutrition service referral as needed  Outcome: Progressing     Problem: SKIN/TISSUE INTEGRITY - ADULT  Goal: Skin integrity remains intact  Description: INTERVENTIONS  - Identify patients at risk for skin breakdown  - Assess and monitor skin integrity  - Assess and monitor nutrition and hydration status  - Monitor labs (i e  albumin)  - Assess for incontinence   - Turn and reposition patient  - Assist with mobility/ambulation  - Relieve pressure over bony prominences  - Avoid friction and shearing  - Provide appropriate hygiene as needed including keeping skin clean and dry  - Evaluate need for skin moisturizer/barrier cream  - Collaborate with interdisciplinary team (i e  Nutrition, Rehabilitation, etc )   - Patient/family teaching  Outcome: Progressing  Goal: Oral mucous membranes remain intact  Description: INTERVENTIONS  - Assess oral mucosa and hygiene practices  - Implement preventative oral hygiene regimen  - Implement oral medicated treatments as ordered  - Initiate Nutrition services referral as needed  Outcome: Progressing     Problem: HEMATOLOGIC - ADULT  Goal: Maintains hematologic stability  Description: INTERVENTIONS  - Assess for signs and symptoms of bleeding or hemorrhage  - Monitor labs  - Administer supportive blood products/factors as ordered and appropriate  Outcome: Not Progressing     Problem: MUSCULOSKELETAL - ADULT  Goal: Maintain or return mobility to safest level of function  Description: INTERVENTIONS:  - Assess patient's ability to carry out ADLs; assess patient's baseline for ADL function and identify physical deficits which impact ability to perform ADLs (bathing, care of mouth/teeth, toileting, grooming, dressing, etc )  - Assess/evaluate cause of self-care deficits   - Assess range of motion  - Assess patient's mobility  - Assess patient's need for assistive devices and provide as appropriate  - Encourage maximum independence but intervene and supervise when necessary  - Involve family in performance of ADLs  - Assess for home care needs following discharge   - Consider OT consult to assist with ADL evaluation and planning for discharge  - Provide patient education as appropriate  Outcome: Progressing  Goal: Maintain proper alignment of affected body part  Description: INTERVENTIONS:  - Support, maintain and protect limb and body alignment  - Provide patient/ family with appropriate education  Outcome: Progressing

## 2021-01-29 NOTE — ASSESSMENT & PLAN NOTE
Unprovoked as evidenced by findings of extensive upper and lower lobe pulmonary emboli with RV strain on CTA  Patient reports near syncopal episode last evening with 2 day history of not feeling well, pleurisy, difficulty breathing, palpitations    Patient denies all risk factors  Oxygenating at 97% on [facility]  Maintain oxygen saturations>92%  Heparin infusion per VTE/PE protocol  Cautious fluid administration if needed

## 2021-01-29 NOTE — EMTALA/ACUTE CARE TRANSFER
[facility] EMERGENCY DEPARTMENT  [address] [facility] [address]  Dept: [phone]      EMTALA TRANSFER CONSENT    NAME Anjum Wilkerson                                         1965                              MRN [id number]    I have been informed of my rights regarding examination, treatment, and transfer   by Dr Poli Thomas MD    Benefits: Specialized equipment and/or services available at the receiving facility (Include comment)________________________Intensivist    Risks: Potential for delay in receiving treatment      Consent for Transfer:  I acknowledge that my medical condition has been evaluated and explained to me by the emergency department physician or other qualified medical person and/or my attending physician, who has recommended that I be transferred to the service of  Accepting Physician: Dr Myrtle Downs at [address] Name, [address] : [address] [facility]  The above potential benefits of such transfer, the potential risks associated with such transfer, and the probable risks of not being transferred have been explained to me, and I fully understand them  The doctor has explained that, in my case, the benefits of transfer outweigh the risks  I agree to be transferred  I authorize the performance of emergency medical procedures and treatments upon me in both transit and upon arrival at the receiving facility  Additionally, I authorize the release of any and all medical records to the receiving facility and request they be transported with me, if possible  I understand that the safest mode of transportation during a medical emergency is an ambulance and that the Hospital advocates the use of this mode of transport  Risks of traveling to the receiving facility by car, including absence of medical control, life sustaining equipment, such as oxygen, and medical personnel has been explained to me and I fully understand them      ([place] BELOW)  [ x ]  I consent to the stated transfer and to be transported by ambulance/helicopter  [  ]  I consent to the stated transfer, but refuse transportation by ambulance and accept full responsibility for my transportation by car  I understand the risks of non-ambulance transfers and I exonerate the Hospital and its staff from any deterioration in my condition that results from this refusal     X___________________________________________    DATE  21  TIME________  Signature of patient or legally responsible individual signing on patient behalf           RELATIONSHIP TO PATIENT_________________________          Provider Certification    NAME Tone Patel                                         1965                              MRN [id number]    A medical screening exam was performed on the above named patient  Based on the examination:    Condition Necessitating Transfer The primary encounter diagnosis was Bilateral pulmonary embolism ([address] )  Diagnoses of Leukocytosis, Elevated troponin, and Cardiac ischemia were also pertinent to this visit      Patient Condition: The patient has been stabilized such that within reasonable medical probability, no material deterioration of the patient condition or the condition of the unborn child(lady) is likely to result from the transfer    Reason for Transfer: Level of Care needed not available at this facility    Transfer Requirements: [address] [address]   · Space available and qualified personnel available for treatment as acknowledged by PACS Carlyn Ny -   · Agreed to accept transfer and to provide appropriate medical treatment as acknowledged by       Dr Shivani Chow  · Appropriate medical records of the examination and treatment of the patient are provided at the time of transfer   [address], [address] _______  · Transfer will be performed by qualified personnel from    and appropriate transfer equipment as required, including the use of necessary and appropriate life support measures  Provider Certification: I have examined the patient and explained the following risks and benefits of being transferred/refusing transfer to the patient/family:  General risk, such as traffic hazards, adverse weather conditions, rough terrain or turbulence, possible failure of equipment (including vehicle or aircraft), or consequences of actions of persons outside the control of the transport personnel, Unanticipated needs of medical equipment and personnel during transport, Risk of worsening condition, The possibility of a transport vehicle being unavailable      Based on these reasonable risks and benefits to the patient and/or the unborn child(lady), and based upon the information available at the time of the patients examination, I certify that the medical benefits reasonably to be expected from the provision of appropriate medical treatments at another medical facility outweigh the increasing risks, if any, to the individuals medical condition, and in the case of labor to the unborn child, from effecting the transfer      X____________________________________________ DATE 01/29/21        TIME_______      ORIGINAL - SEND TO MEDICAL RECORDS   COPY - SEND WITH PATIENT DURING TRANSFER

## 2021-01-29 NOTE — H&P
H&P- Reinaldo Toth Duglas Carlos 1965, 61 y o  male MRN: [id number]    Unit/Bed#: ICU 14 Encounter: [id number]    Primary Care Provider: No primary care provider on file  Date and time admitted to hospital: 1/29/2021  2:59 AM        Bilateral pulmonary embolism (HCC)  Assessment & Plan  Unprovoked as evidenced by findings of extensive upper and lower lobe pulmonary emboli with RV strain on CTA  Patient reports near syncopal episode last evening with 2 day history of not feeling well, pleurisy, difficulty breathing, palpitations  Patient denies all risk factors  Oxygenating at 97% on [facility]  Maintain oxygen saturations>92%  Heparin infusion per VTE/PE protocol  Cautious fluid administration if needed        Hyperglycemia  Assessment & Plan  Will check HA1C  Regular diet  Monitor glucose with am chemistry    TANG (acute kidney injury) ([facility] [address] )  Assessment & Plan  Creatinine 1 65 on admission, unknown baseline  Patient denies history  Monitor renal indices and urine output  Avoid nephrotoxins    Elevated troponin  Assessment & Plan  Likely secondary to pulmonary embolism  Will trend troponins  Patient is currently without chest pain    Leukocytosis  Assessment & Plan  May be inflammatory, denies any s/s of infection  Monitor for wbc, temps      -------------------------------------------------------------------------------------------------------------  Chief Complaint: dyspnea, near syncope    History of Present Illness   HX and PE limited by: Brody Noriega is a 61 y o  male with no PMH who presents with near syncopal episode last evening and a 2 day history of not feeling well, night sweats pleurisy, dyspnea, palpitations  Patient reports that he thought he had COVID which is what prompted his ED visit  Upon admission to [place] ED, a CTA revealed bilateral upper and lower lobe pulmonary embolism with right heart strain  He was placed on a heparin infusion and transferred to [facility] for high level of care   He was also treated for sepsis at [facility]  Patient reported that he had contacts with co-workers that tested positive for Francesca  His last contact with them was 2 weeks ago  History obtained from chart review and the patient   -------------------------------------------------------------------------------------------------------------  Dispo: Admit to Stepdown Level 1    Code Status: Level 1 - Full Code  --------------------------------------------------------------------------------------------------------------  Review of Systems   Constitutional: Positive for diaphoresis  HENT: Negative  Eyes: Negative  Respiratory: Positive for shortness of breath  Cardiovascular: Positive for chest pain and palpitations  Gastrointestinal: Negative  Endocrine: Negative  Genitourinary: Negative  Musculoskeletal: Negative  Skin: Negative  Allergic/Immunologic: Negative  Neurological: Positive for dizziness, weakness and light-headedness  Hematological: Negative  Psychiatric/Behavioral: Negative  A 12-point, complete review of systems was reviewed and negative except as stated above     Physical Exam  Vitals signs and nursing note reviewed  Constitutional:       General: He is not in acute distress  Appearance: Normal appearance  HENT:      Head: Normocephalic and atraumatic  Right Ear: External ear normal       Left Ear: External ear normal       Nose: Nose normal       Mouth/Throat:      Mouth: Mucous membranes are moist       Pharynx: Oropharynx is clear  Eyes:      Extraocular Movements: Extraocular movements intact  Conjunctiva/sclera: Conjunctivae normal       Pupils: Pupils are equal, round, and reactive to light  Neck:      Musculoskeletal: Normal range of motion and neck supple  Cardiovascular:      Rate and Rhythm: Normal rate and regular rhythm  Pulses: Normal pulses  Heart sounds: Normal heart sounds     Pulmonary:      Effort: Pulmonary effort is normal       Breath sounds: Normal breath sounds  Abdominal:      General: Bowel sounds are normal       Palpations: Abdomen is soft  Musculoskeletal: Normal range of motion  Right lower leg: No edema  Left lower leg: No edema  Skin:     General: Skin is warm and dry  Capillary Refill: Capillary refill takes less than 2 seconds  Neurological:      General: No focal deficit present  Mental Status: He is alert and oriented to person, place, and time  Psychiatric:         Mood and Affect: Mood normal          Behavior: Behavior normal          Thought Content: Thought content normal          Judgment: Judgment normal        --------------------------------------------------------------------------------------------------------------  Vitals:   Vitals:    01/29/21 0308   Weight: 105 kg (231 lb 7 7 oz)     Temp  Min: 97 5 °F (36 4 °C)  Max: 97 5 °F (36 4 °C)  IBW: -88 kg     There is no height or weight on file to calculate BMI      Laboratory and Diagnostics:  Results from last 7 days   Lab Units 01/28/21 1947   WBC Thousand/uL 18 42*   HEMOGLOBIN g/dL 15 7   HEMATOCRIT % 47 4   PLATELETS Thousands/uL 228   NEUTROS PCT % 82*   MONOS PCT % 6     Results from last 7 days   Lab Units 01/28/21 1947   SODIUM mmol/L 140   POTASSIUM mmol/L 3 9   CHLORIDE mmol/L 103   CO2 mmol/L 22   ANION GAP mmol/L 15*   BUN mg/dL 19   CREATININE mg/dL 1 65*   CALCIUM mg/dL 10 1   GLUCOSE RANDOM mg/dL 202*   ALT U/L 14   AST U/L 16   ALK PHOS U/L 84 7   ALBUMIN g/dL 4 3   TOTAL BILIRUBIN mg/dL 1 15     Results from last 7 days   Lab Units 01/28/21 1947   MAGNESIUM mg/dL 2 2      Results from last 7 days   Lab Units 01/28/21 1947   PTT seconds 26      Results from last 7 days   Lab Units 01/28/21 1947   TROPONIN I ng/mL 0 22*     Results from last 7 days   Lab Units 01/28/21 2231 01/28/21 2029   LACTIC ACID mmol/L 2 1* 2 3*     ABG:    VBG:          Micro:  Results from last 7 days   Lab Units 01/28/21 2029 BLOOD CULTURE  Received in Microbiology Lab  Culture in Progress  Received in Microbiology Lab  Culture in Progress  EKG: NSR  Imaging: I have personally reviewed pertinent reports  Historical Information   History reviewed  No pertinent past medical history  History reviewed  No pertinent surgical history  Social History   Social History     Substance and Sexual Activity   Alcohol Use Yes    Comment: occasionally     Social History     Substance and Sexual Activity   Drug Use Never     Social History     Tobacco Use   Smoking Status Never Smoker     Exercise History: n/a  Family History:   History reviewed  No pertinent family history  I have reviewed this patient's family history and commented on sigificant items within the HPI      Medications:  Current Facility-Administered Medications   Medication Dose Route Frequency    chlorhexidine (PERIDEX) 0 12 % oral rinse 15 mL  15 mL Swish & Spit Q12H [address]     Home medications:  Prior to Admission Medications   Prescriptions Last Dose Informant Patient Reported? Taking? Ascorbic Acid (VITAMIN C PO)   Yes No   Sig: Take by mouth   VITAMIN D PO   Yes No   Sig: Take by mouth      Facility-Administered Medications: None     Allergies:  No Known Allergies    ------------------------------------------------------------------------------------------------------------  Advance Directive and Living Will:      Power of :    POLST:    ------------------------------------------------------------------------------------------------------------  Anticipated Length of Stay is > 2 midnights    Care Time Delivered:   No Critical Care time spent       JULISA Prasad        Portions of the record may have been created with voice recognition software  Occasional wrong word or "sound a like" substitutions may have occurred due to the inherent limitations of voice recognition software    Read the chart carefully and recognize, using context, where substitutions have occurred

## 2021-01-30 LAB
ANION GAP SERPL CALCULATED.3IONS-SCNC: 14 MMOL/L (ref 4–13)
APTT PPP: 68 SECONDS (ref 23–37)
APTT PPP: 69 SECONDS (ref 23–37)
BASOPHILS # BLD AUTO: 0.05 THOUSANDS/ΜL (ref 0–0.1)
BASOPHILS NFR BLD AUTO: 0 % (ref 0–1)
BUN SERPL-MCNC: 17 MG/DL (ref 5–25)
CALCIUM SERPL-MCNC: 9.4 MG/DL (ref 8.3–10.1)
CHLORIDE SERPL-SCNC: 109 MMOL/L (ref 100–108)
CO2 SERPL-SCNC: 19 MMOL/L (ref 21–32)
CREAT SERPL-MCNC: 1.01 MG/DL (ref 0.6–1.3)
EOSINOPHIL # BLD AUTO: 0.84 THOUSAND/ΜL (ref 0–0.61)
EOSINOPHIL NFR BLD AUTO: 6 % (ref 0–6)
ERYTHROCYTE [DISTWIDTH] IN BLOOD BY AUTOMATED COUNT: 13.8 % (ref 11.6–15.1)
GFR SERPL CREATININE-BSD FRML MDRD: 82 ML/MIN/1.73SQ M
GLUCOSE SERPL-MCNC: 134 MG/DL (ref 65–140)
HCT VFR BLD AUTO: 42 % (ref 36.5–49.3)
HGB BLD-MCNC: 13.4 G/DL (ref 12–17)
IMM GRANULOCYTES # BLD AUTO: 0.05 THOUSAND/UL (ref 0–0.2)
IMM GRANULOCYTES NFR BLD AUTO: 0 % (ref 0–2)
LYMPHOCYTES # BLD AUTO: 3.52 THOUSANDS/ΜL (ref 0.6–4.47)
LYMPHOCYTES NFR BLD AUTO: 27 % (ref 14–44)
MCH RBC QN AUTO: 28.7 PG (ref 26.8–34.3)
MCHC RBC AUTO-ENTMCNC: 31.9 G/DL (ref 31.4–37.4)
MCV RBC AUTO: 90 FL (ref 82–98)
MONOCYTES # BLD AUTO: 0.92 THOUSAND/ΜL (ref 0.17–1.22)
MONOCYTES NFR BLD AUTO: 7 % (ref 4–12)
NEUTROPHILS # BLD AUTO: 7.65 THOUSANDS/ΜL (ref 1.85–7.62)
NEUTS SEG NFR BLD AUTO: 60 % (ref 43–75)
NRBC BLD AUTO-RTO: 0 /100 WBCS
PLATELET # BLD AUTO: 153 THOUSANDS/UL (ref 149–390)
PMV BLD AUTO: 10.5 FL (ref 8.9–12.7)
POTASSIUM SERPL-SCNC: 3.9 MMOL/L (ref 3.5–5.3)
RBC # BLD AUTO: 4.67 MILLION/UL (ref 3.88–5.62)
SODIUM SERPL-SCNC: 142 MMOL/L (ref 136–145)
WBC # BLD AUTO: 13.03 THOUSAND/UL (ref 4.31–10.16)

## 2021-01-30 PROCEDURE — 85730 THROMBOPLASTIN TIME PARTIAL: CPT | Performed by: NURSE PRACTITIONER

## 2021-01-30 PROCEDURE — 85025 COMPLETE CBC W/AUTO DIFF WBC: CPT | Performed by: NURSE PRACTITIONER

## 2021-01-30 PROCEDURE — 80048 BASIC METABOLIC PNL TOTAL CA: CPT | Performed by: NURSE PRACTITIONER

## 2021-01-30 PROCEDURE — 99232 SBSQ HOSP IP/OBS MODERATE 35: CPT | Performed by: INTERNAL MEDICINE

## 2021-01-30 RX ADMIN — APIXABAN 10 MG: 5 TABLET, FILM COATED ORAL at 21:09

## 2021-01-30 RX ADMIN — HEPARIN SODIUM 12 UNITS/KG/HR: 10000 INJECTION, SOLUTION INTRAVENOUS at 16:52

## 2021-01-30 NOTE — ASSESSMENT & PLAN NOTE
Unprovoked as evidenced by findings of extensive upper and lower lobe pulmonary emboli with RV strain on CTA  Patient reports near syncopal episode last evening with 2 day history of not feeling well, pleurisy, difficulty breathing, palpitations    Patient denies provoking factors; family history of colon cancer  Encouraged to seek outpatient screening  Oxygenating well on room air  Maintain oxygen saturations>92%  Heparin infusion per VTE/PE protocol  Cautious fluid administration if needed

## 2021-01-30 NOTE — UTILIZATION REVIEW
Initial Clinical Review    Admission: Date/Time/Statement:   Admission Orders (From admission, onward)     Ordered        01/29/21 0301  Inpatient Admission  Once                   Orders Placed This Encounter   Procedures    Inpatient Admission     Standing Status:   Standing     Number of Occurrences:   1     Order Specific Question:   Level of Care     Answer:   Level 1 Stepdown [13]     Order Specific Question:   Estimated length of stay     Answer:   More than 2 Midnights     Order Specific Question:   Certification     Answer:   I certify that inpatient services are medically necessary for this patient for a duration of greater than two midnights  See H&P and MD Progress Notes for additional information about the patient's course of treatment  Assessment/Plan: 60 yo m w/no pmhx transferred by EMS from [address] [place] [address] ED to [address] [place] [address] ICU, admitted as inpatient due to bilat PE's, acute hypoxic resp failure, and TANG  Presented to [facility] w/2 days of feeling unwell, night sweats, dyspnea, palpitations, pleurisy  Workup revealed tachypneic, tachycardic, decareased breath sounds throughout, acute submiassive bilateral upper/lower PE and R heart strain w/leukocytosis & lactic acidosis  Pt also had contact with COVID positive person 2 weeks prior  Heparin gtt started and decision made to transfer to [facility] for higher level of care  On arrival, lungs clear, 97% on 2 liters, also in ATNG with elevated trops  Avoiding nephrotoxins  Continue heparin gtt  1/30: resp failure, lactic acidosis, TANG, leukocytosis improving  PE's were unprovoked  Lungs remain clear, now on room air, heparin gtt in progress  Trop trending down, peak was   32 no chest pain  He was hyperglycemic with a1c 6 3, carb controlled diet ordered  Echo showed severe R heart strain  Transfer to med surg today and keep in house for at least one more night       ED Triage Vitals   Temperature Pulse Respirations Blood Pressure SpO2   01/29/21 0400 01/29/21 0400 01/29/21 0400 01/29/21 0400 01/29/21 0400   98 1 °F (36 7 °C) 90 12 100/75 96 %      Temp Source Heart Rate Source Patient Position - Orthostatic VS BP Location FiO2 (%)   01/29/21 0400 01/29/21 0400 01/29/21 0400 01/29/21 0400 --   Tympanic Monitor Lying Left arm       Pain Score       01/29/21 0402       No Pain          Wt Readings from Last 1 Encounters:   01/30/21 104 kg (229 lb 4 5 oz)     Additional Vital Signs:   Date/Time  Temp  Pulse  Resp BP MAP   SpO2  O2 Device    01/30/21 0900  --  80  22 126/80 100  95 %  --    01/30/21 0800  --  90  26Abnormal  123/73 88  94 %  None (Room air)    01/30/21 0700  98 1  90  25Abnormal  124/74 89  94 %  --    01/30/21 0400  98 3 °F (36 8 °C)  --  -- 146/80 --  --  --    01/29/21 2300  98 2 °F (36 8 °C)  --  -- -- --  --  --    01/29/21 1900  97 1 °F (36 2 °C)Abnormal   --  -- -- --  --  --    01/29/21 1800  --  96  17 140/77 83  95 %  --    01/29/21 1600  --  78  16 118/76 88  97 %  --    01/29/21 1521  97 7 °F (36 5 °C)  --  -- -- --  --  --    01/29/21 1200  --  92  15 113/78 97  96 %  --    01/29/21 1037  97 8 °F (36 6 °C)  --  -- -- --  --  --    01/29/21 1000  --  80  15 96/76 85  96 %  --    01/29/21 0900  --  86  17 102/79 91  96 %  --    01/29/21 0800  --  80  13 105/79 87  97 %  --    01/29/21 0701  97 8 °F (36 6 °C)  --  -- -- --  --  --    01/29/21 0700  --  86  16 100/80 91  95 %  --    01/29/21 0600  --  84  12 95/82 88  95 %  --    01/29/21 0500  --  84  15 98/80 89  95 %  --        Pertinent Labs/Diagnostic Test Results:   1/28 PE study (done at [facility])    Extensive bilateral upper and lower lobar pulmonary emboli     Right ventricle is dilated consistent with right heart strain      1/29 EKG Normal sinus rhythm  RSR' or QR pattern in V1 suggests right ventricular conduction delay  ST & T wave abnormality, consider inferior ischemia  ST & T wave abnormality, consider anterolateral ischemia  Prolonged QT    1/29 echo: LEFT VENTRICLE:  The left ventricle was not well seen but appeared to have normal systolic function  Left ventricular size was normal  Left ventricular wall thickness was normal  No regional wall motion abnormalities were noted but could not be ruled out      RIGHT VENTRICLE:  The right ventricle was significantly enlarged and hypokinetic  The right ventricular free wall appeared near akinetic  There was better contractility at the apex consistent with Alvares's sign but not completely classical since apical  contractility was also reduced      RIGHT ATRIUM:  Right atrial enlargement      MITRAL VALVE:  Mild mitral regurgitation      AORTIC VALVE:  No aortic stenosis or regurgitation      TRICUSPID VALVE:  There was moderate regurgitation      PULMONIC VALVE:  There was trace regurgitation      PULMONARY ARTERIES:  Severe pulmonary hypertension  Pulmonary artery systolic pressure is estimated 78 mmHg    Results from last 7 days   Lab Units 01/28/21 1947   SARS-COV-2  Negative     Results from last 7 days   Lab Units 01/30/21  0514 01/29/21 0318 01/28/21 1947   WBC Thousand/uL 13 03* 15 17* 18 42*   HEMOGLOBIN g/dL 13 4 14 4 15 7   HEMATOCRIT % 42 0 44 5 47 4   PLATELETS Thousands/uL 153 183 228   NEUTROS ABS Thousands/µL 7 65*  --  15 08*         Results from last 7 days   Lab Units 01/30/21  0514 01/28/21 1947   SODIUM mmol/L 142 140   POTASSIUM mmol/L 3 9 3 9   CHLORIDE mmol/L 109* 103   CO2 mmol/L 19* 22   ANION GAP mmol/L 14* 15*   BUN mg/dL 17 19   CREATININE mg/dL 1 01 1 65*   EGFR ml/min/1 73sq m 82 45   CALCIUM mg/dL 9 4 10 1   MAGNESIUM mg/dL  --  2 2     Results from last 7 days   Lab Units 01/28/21 1947   AST U/L 16   ALT U/L 14   ALK PHOS U/L 84 7   TOTAL PROTEIN g/dL 8 0   ALBUMIN g/dL 4 3   TOTAL BILIRUBIN mg/dL 1 15         Results from last 7 days   Lab Units 01/30/21  0514 01/28/21 1947   GLUCOSE RANDOM mg/dL 134 202*         Results from last 7 days   Lab Units 01/29/21 0318 01/28/21 1947   HEMOGLOBIN A1C % 6  3* 6 3*   EAG mg/dl 134 134       Results from last 7 days   Lab Units 01/29/21  0858 01/29/21  0616 01/29/21  0319 01/28/21 1947   TROPONIN I ng/mL 0 17* 0 26* 0 32* 0 22*     Results from last 7 days   Lab Units 01/28/21 1947   D-DIMER QUANTITATIVE mg/L FEU 10 68*     Results from last 7 days   Lab Units 01/30/21  0514 01/29/21  2304 01/29/21  1517  01/29/21 0319   PROTIME seconds  --   --   --   --  16 4*   INR   --   --   --   --  1 35*   PTT seconds 69* 68* 117*   < >  --     < > = values in this interval not displayed  Results from last 7 days   Lab Units 01/29/21  0616 01/29/21  0321 01/28/21  2231 01/28/21 2029   LACTIC ACID mmol/L 2 1* 2 7* 2 1* 2 3*       Results from last 7 days   Lab Units 01/29/21 0319   NT-PRO BNP pg/mL 7,113*     Results from last 7 days   Lab Units 01/28/21 1947   INFLUENZA A PCR  Negative   INFLUENZA B PCR  Negative   RSV PCR  Negative     Results from last 7 days   Lab Units 01/29/21  0621 01/29/21  0616 01/28/21 2029   BLOOD CULTURE  No Growth at 24 hrs  No Growth at 24 hrs  No Growth at 24 hrs  No Growth at 24 hrs  History reviewed  No pertinent past medical history    Present on Admission:   Bilateral pulmonary embolism (HCC)   Leukocytosis   Elevated troponin   TANG (acute kidney injury) ([facility] [address] )   Cardiac ischemia   Hyperglycemia      Admitting Diagnosis: Bilateral pulmonary embolism (HCC) [I26 [id number]]  Age/Sex: 61 y o  male  Admission Orders:  Scheduled Medications:  heparin (porcine), 8,000 Units, Intravenous, Once      Continuous IV Infusions:  heparin (porcine), 3-30 Units/kg/hr (Order-Specific), Intravenous, Titrated      PRN Meds:  heparin (porcine), 4,000 Units, Intravenous, Q1H PRN  heparin (porcine), 8,000 Units, Intravenous, Q1H PRN    SCD's  Neuro checks q shift    IP CONSULT TO CASE MANAGEMENT    Network Utilization Review Department  ATTENTION: Please call with any questions or concerns to [phone] and carefully listen to the prompts so that you are directed to the right person  All voicemails are confidential   Ronel Flores all requests for admission clinical reviews, approved or denied determinations and any other requests to dedicated fax number below belonging to the campus where the patient is receiving treatment   List of dedicated fax numbers for the Facilities:  [address] [place] [address] DENIALS (Administrative/Medical Necessity) [phone]   [address]  [address] (Maternity/NICU/Pediatrics) [phone] [address] Alisson Beckman 9702 (  Flakita Lee "Yessica" 103) [phone] [address] Karen Charles Hagan [address] [place] [address] [phone]

## 2021-01-30 NOTE — ASSESSMENT & PLAN NOTE
Creatinine 1 65 on admission, unknown baseline   Patient denies history  Monitor renal indices and urine output  Avoid nephrotoxins

## 2021-01-30 NOTE — PLAN OF CARE
Problem: Potential for Falls  Goal: Patient will remain free of falls  Description: INTERVENTIONS:  - Assess patient frequently for physical needs  -  Identify cognitive and physical deficits and behaviors that affect risk of falls    -  Ralph fall precautions as indicated by assessment   - Educate patient/family on patient safety including physical limitations  - Instruct patient to call for assistance with activity based on assessment  - Modify environment to reduce risk of injury  - Consider OT/PT consult to assist with strengthening/mobility  Outcome: Progressing     Problem: Prexisting or High Potential for Compromised Skin Integrity  Goal: Skin integrity is maintained or improved  Description: INTERVENTIONS:  - Identify patients at risk for skin breakdown  - Assess and monitor skin integrity  - Assess and monitor nutrition and hydration status  - Monitor labs   - Assess for incontinence   - Turn and reposition patient  - Assist with mobility/ambulation  - Relieve pressure over bony prominences  - Avoid friction and shearing  - Provide appropriate hygiene as needed including keeping skin clean and dry  - Evaluate need for skin moisturizer/barrier cream  - Collaborate with interdisciplinary team   - Patient/family teaching  - Consider wound care consult   Outcome: Progressing     Problem: NEUROSENSORY - ADULT  Goal: Achieves stable or improved neurological status  Description: INTERVENTIONS  - Monitor and report changes in neurological status  - Monitor vital signs such as temperature, blood pressure, glucose, and any other labs ordered   - Initiate measures to prevent increased intracranial pressure  - Monitor for seizure activity and implement precautions if appropriate      Outcome: Progressing  Goal: Achieves maximal functionality and self care  Description: INTERVENTIONS  - Monitor swallowing and airway patency with patient fatigue and changes in neurological status  - Encourage and assist patient to increase activity and self care     - Encourage visually impaired, hearing impaired and aphasic patients to use assistive/communication devices  Outcome: Progressing     Problem: CARDIOVASCULAR - ADULT  Goal: Maintains optimal cardiac output and hemodynamic stability  Description: INTERVENTIONS:  - Monitor I/O, vital signs and rhythm  - Monitor for S/S and trends of decreased cardiac output  - Administer and titrate ordered vasoactive medications to optimize hemodynamic stability  - Assess quality of pulses, skin color and temperature  - Assess for signs of decreased coronary artery perfusion  - Instruct patient to report change in severity of symptoms  Outcome: Progressing  Goal: Absence of cardiac dysrhythmias or at baseline rhythm  Description: INTERVENTIONS:  - Continuous cardiac monitoring, vital signs, obtain 12 lead EKG if ordered  - Administer antiarrhythmic and heart rate control medications as ordered  - Monitor electrolytes and administer replacement therapy as ordered  Outcome: Progressing     Problem: RESPIRATORY - ADULT  Goal: Achieves optimal ventilation and oxygenation  Description: INTERVENTIONS:  - Assess for changes in respiratory status  - Assess for changes in mentation and behavior  - Position to facilitate oxygenation and minimize respiratory effort  - Oxygen administered by appropriate delivery if ordered  - Initiate smoking cessation education as indicated  - Encourage broncho-pulmonary hygiene including cough, deep breathe, Incentive Spirometry  - Assess the need for suctioning and aspirate as needed  - Assess and instruct to report SOB or any respiratory difficulty  - Respiratory Therapy support as indicated  Outcome: Progressing     Problem: GASTROINTESTINAL - ADULT  Goal: Minimal or absence of nausea and/or vomiting  Description: INTERVENTIONS:  - Administer IV fluids if ordered to ensure adequate hydration  - Maintain NPO status until nausea and vomiting are resolved  - Nasogastric tube if ordered  - Administer ordered antiemetic medications as needed  - Provide nonpharmacologic comfort measures as appropriate  - Advance diet as tolerated, if ordered  - Consider nutrition services referral to assist patient with adequate nutrition and appropriate food choices  Outcome: Progressing  Goal: Maintains or returns to baseline bowel function  Description: INTERVENTIONS:  - Assess bowel function  - Encourage oral fluids to ensure adequate hydration  - Administer IV fluids if ordered to ensure adequate hydration  - Administer ordered medications as needed  - Encourage mobilization and activity  - Consider nutritional services referral to assist patient with adequate nutrition and appropriate food choices  Outcome: Progressing  Goal: Maintains adequate nutritional intake  Description: INTERVENTIONS:  - Monitor percentage of each meal consumed  - Identify factors contributing to decreased intake, treat as appropriate  - Assist with meals as needed  - Monitor I&O, weight, and lab values if indicated  - Obtain nutrition services referral as needed  Outcome: Progressing     Problem: GENITOURINARY - ADULT  Goal: Maintains or returns to baseline urinary function  Description: INTERVENTIONS:  - Assess urinary function  - Encourage oral fluids to ensure adequate hydration if ordered  - Administer IV fluids as ordered to ensure adequate hydration  - Administer ordered medications as needed  - Offer frequent toileting  - Follow urinary retention protocol if ordered  Outcome: Progressing  Goal: Absence of urinary retention  Description: INTERVENTIONS:  - Assess patients ability to void and empty bladder  - Monitor I/O  - Bladder scan as needed  - Discuss with physician/AP medications to alleviate retention as needed  - Discuss catheterization for long term situations as appropriate  Outcome: Progressing     Problem: METABOLIC, FLUID AND ELECTROLYTES - ADULT  Goal: Electrolytes maintained within normal limits  Description: INTERVENTIONS:  - Monitor labs and assess patient for signs and symptoms of electrolyte imbalances  - Administer electrolyte replacement as ordered  - Monitor response to electrolyte replacements, including repeat lab results as appropriate  - Instruct patient on fluid and nutrition as appropriate  Outcome: Progressing  Goal: Fluid balance maintained  Description: INTERVENTIONS:  - Monitor labs   - Monitor I/O and WT  - Instruct patient on fluid and nutrition as appropriate  - Assess for signs & symptoms of volume excess or deficit  Outcome: Progressing  Goal: Glucose maintained within target range  Description: INTERVENTIONS:  - Monitor Blood Glucose as ordered  - Assess for signs and symptoms of hyperglycemia and hypoglycemia  - Administer ordered medications to maintain glucose within target range  - Assess nutritional intake and initiate nutrition service referral as needed  Outcome: Progressing     Problem: SKIN/TISSUE INTEGRITY - ADULT  Goal: Skin integrity remains intact  Description: INTERVENTIONS  - Identify patients at risk for skin breakdown  - Assess and monitor skin integrity  - Assess and monitor nutrition and hydration status  - Monitor labs (i e  albumin)  - Assess for incontinence   - Turn and reposition patient  - Assist with mobility/ambulation  - Relieve pressure over bony prominences  - Avoid friction and shearing  - Provide appropriate hygiene as needed including keeping skin clean and dry  - Evaluate need for skin moisturizer/barrier cream  - Collaborate with interdisciplinary team (i e  Nutrition, Rehabilitation, etc )   - Patient/family teaching  Outcome: Progressing  Goal: Oral mucous membranes remain intact  Description: INTERVENTIONS  - Assess oral mucosa and hygiene practices  - Implement preventative oral hygiene regimen  - Implement oral medicated treatments as ordered  - Initiate Nutrition services referral as needed  Outcome: Progressing     Problem: HEMATOLOGIC - ADULT  Goal: Maintains hematologic stability  Description: INTERVENTIONS  - Assess for signs and symptoms of bleeding or hemorrhage  - Monitor labs  - Administer supportive blood products/factors as ordered and appropriate  Outcome: Progressing     Problem: MUSCULOSKELETAL - ADULT  Goal: Maintain or return mobility to safest level of function  Description: INTERVENTIONS:  - Assess patient's ability to carry out ADLs; assess patient's baseline for ADL function and identify physical deficits which impact ability to perform ADLs (bathing, care of mouth/teeth, toileting, grooming, dressing, etc )  - Assess/evaluate cause of self-care deficits   - Assess range of motion  - Assess patient's mobility  - Assess patient's need for assistive devices and provide as appropriate  - Encourage maximum independence but intervene and supervise when necessary  - Involve family in performance of ADLs  - Assess for home care needs following discharge   - Consider OT consult to assist with ADL evaluation and planning for discharge  - Provide patient education as appropriate  Outcome: Progressing  Goal: Maintain proper alignment of affected body part  Description: INTERVENTIONS:  - Support, maintain and protect limb and body alignment  - Provide patient/ family with appropriate education  Outcome: Progressing

## 2021-01-30 NOTE — ASSESSMENT & PLAN NOTE
Likely secondary to pulmonary embolism  Troponin peaked at 0 32  Patient is currently without chest pain

## 2021-01-30 NOTE — PROGRESS NOTES
Progress Note - Mike Hidden 1965, 61 y o  male MRN: [id number]    Unit/Bed#: ICU 14 Encounter: [id number]    Primary Care Provider: No primary care provider on file  Date and time admitted to hospital: 1/29/2021  2:59 AM        * Bilateral pulmonary embolism (HCC)  Assessment & Plan  Unprovoked as evidenced by findings of extensive upper and lower lobe pulmonary emboli with RV strain on CTA  Patient reports near syncopal episode last evening with 2 day history of not feeling well, pleurisy, difficulty breathing, palpitations  Patient denies provoking factors; family history of colon cancer  Encouraged to seek outpatient screening  Oxygenating well on room air  Maintain oxygen saturations>92%  Heparin infusion per VTE/PE protocol  Cautious fluid administration if needed          Hyperglycemia  Assessment & Plan  Will check HA1C-6 3  Carb controlled diet  Encourage lifestyle modifications      TANG (acute kidney injury) ([facility] [address] )  Assessment & Plan  Creatinine 1 65 on admission, unknown baseline  Patient denies history  Monitor renal indices and urine output  Avoid nephrotoxins     Elevated troponin  Assessment & Plan  Likely secondary to pulmonary embolism  Troponin peaked at 0 32  Patient is currently without chest pain     Leukocytosis  Assessment & Plan  May be inflammatory, denies any s/s of infection  Monitor for wbc, temps        ----------------------------------------------------------------------------------------  HPI/24hr events: No events overnight  Disposition: Transfer to Stepdown Level 2  Code Status: Level 1 - Full Code  ---------------------------------------------------------------------------------------  SUBJECTIVE  Denies pain, no complaints      Review of Systems  Review of systems was reviewed and negative unless stated above in HPI/24-hour events   ---------------------------------------------------------------------------------------  OBJECTIVE    Vitals   Vitals:    01/29/21 2300 21 0400 21 0515 21 0559   BP:  146/80     BP Location:  Left arm     Pulse:       Resp:       Temp: 98 2 °F (36 8 °C) 98 3 °F (36 8 °C)     TempSrc: Temporal Temporal     SpO2:       Weight:   103 kg (227 lb 11 8 oz) 104 kg (229 lb 4 5 oz)   Height:         Temp (24hrs), Av 8 °F (36 6 °C), Min:97 1 °F (36 2 °C), Max:98 3 °F (36 8 °C)  Current: Temperature: 98 3 °F (36 8 °C)          Respiratory:  SpO2: SpO2: 95 %       Invasive/non-invasive ventilation settings   Respiratory    Lab Data (Last 4 hours)    None         O2/Vent Data (Last 4 hours)    None                Physical Exam  Vitals signs and nursing note reviewed  Constitutional:       General: He is not in acute distress  Appearance: Normal appearance  HENT:      Head: Normocephalic and atraumatic  Right Ear: External ear normal       Left Ear: External ear normal       Nose: Nose normal       Mouth/Throat:      Mouth: Mucous membranes are moist       Pharynx: Oropharynx is clear  Eyes:      Extraocular Movements: Extraocular movements intact  Conjunctiva/sclera: Conjunctivae normal       Pupils: Pupils are equal, round, and reactive to light  Neck:      Musculoskeletal: Normal range of motion and neck supple  Cardiovascular:      Rate and Rhythm: Normal rate and regular rhythm  Pulses: Normal pulses  Heart sounds: Normal heart sounds  Pulmonary:      Effort: Pulmonary effort is normal       Breath sounds: Normal breath sounds  Abdominal:      General: Bowel sounds are normal       Palpations: Abdomen is soft  Musculoskeletal: Normal range of motion  Skin:     General: Skin is warm and dry  Capillary Refill: Capillary refill takes less than 2 seconds  Neurological:      General: No focal deficit present  Mental Status: He is alert and oriented to person, place, and time     Psychiatric:         Mood and Affect: Mood normal          Behavior: Behavior normal          Thought Content: Thought content normal          Judgment: Judgment normal          Laboratory and Diagnostics:  Results from last 7 days   Lab Units 01/30/21  0514 01/29/21  0318 01/28/21  1947   WBC Thousand/uL 13 03* 15 17* 18 42*   HEMOGLOBIN g/dL 13 4 14 4 15 7   HEMATOCRIT % 42 0 44 5 47 4   PLATELETS Thousands/uL 153 183 228   NEUTROS PCT % 60  --  82*   MONOS PCT % 7  --  6     Results from last 7 days   Lab Units 01/30/21  0514 01/28/21  1947   SODIUM mmol/L 142 140   POTASSIUM mmol/L 3 9 3 9   CHLORIDE mmol/L 109* 103   CO2 mmol/L 19* 22   ANION GAP mmol/L 14* 15*   BUN mg/dL 17 19   CREATININE mg/dL 1 01 1 65*   CALCIUM mg/dL 9 4 10 1   GLUCOSE RANDOM mg/dL 134 202*   ALT U/L  --  14   AST U/L  --  16   ALK PHOS U/L  --  84 7   ALBUMIN g/dL  --  4 3   TOTAL BILIRUBIN mg/dL  --  1 15     Results from last 7 days   Lab Units 01/28/21  1947   MAGNESIUM mg/dL 2 2      Results from last 7 days   Lab Units 01/30/21  0514 01/29/21  2304 01/29/21  1517 01/29/21  0616 01/29/21  0319 01/28/21 1947   INR   --   --   --   --  1 35*  --    PTT seconds 69* 68* 117* >210*  --  26      Results from last 7 days   Lab Units 01/29/21  0858 01/29/21  0616 01/29/21  0319 01/28/21 1947   TROPONIN I ng/mL 0 17* 0 26* 0 32* 0 22*     Results from last 7 days   Lab Units 01/29/21  0616 01/29/21  0321 01/28/21  2231 01/28/21 2029   LACTIC ACID mmol/L 2 1* 2 7* 2 1* 2 3*     ABG:    VBG:          Micro  Results from last 7 days   Lab Units 01/29/21  0621 01/29/21  0616 01/28/21 2029   BLOOD CULTURE  Received in Microbiology Lab  Culture in Progress  Received in Microbiology Lab  Culture in Progress  No Growth at 24 hrs  No Growth at 24 hrs  EKG: NSR  Imaging: I have personally reviewed pertinent reports        Intake and Output  I/O       01/28 0701 - 01/29 0700 01/29 0701 - 01/30 0700    I V  (mL/kg)  68 1 (0 7)    Total Intake(mL/kg)  68 1 (0 7)    Urine (mL/kg/hr)  650 (0 3)    Total Output  650    Net  -581 9 Height and Weights   Height: 6' 1" (185 4 cm)  IBW: 79 9 kg  Body mass index is 30 25 kg/m²  Weight (last 2 days)     Date/Time   Weight    01/30/21 0559   104 (229 28)    01/30/21 0515   103 (227 74)    01/29/21 0546   102 (224 43)    01/29/21 0400   102 (224 43)    01/29/21 0308   105 (231 48)                Nutrition       Diet Orders   (From admission, onward)             Start     Ordered    01/29/21 0306  Diet Regular; Regular House  Diet effective now     Question Answer Comment   Diet Type Regular    Regular Regular House    RD to adjust diet per protocol? Yes        01/29/21 0307                  Active Medications  Scheduled Meds:  Current Facility-Administered Medications   Medication Dose Route Frequency Provider Last Rate    heparin (porcine)  3-30 Units/kg/hr (Order-Specific) Intravenous Titrated JULISA Velásquez 12 Units/kg/hr (01/29/21 1628)    heparin (porcine)  4,000 Units Intravenous Q1H PRN JULISA Velásquez      heparin (porcine)  8,000 Units Intravenous Once JULISA Velásquez      heparin (porcine)  8,000 Units Intravenous Q1H PRN JULISA Velásquez       Continuous Infusions:  heparin (porcine), 3-30 Units/kg/hr (Order-Specific), Last Rate: 12 Units/kg/hr (01/29/21 1628)      PRN Meds:   heparin (porcine), 4,000 Units, Q1H PRN  heparin (porcine), 8,000 Units, Q1H PRN        Invasive Devices Review  Invasive Devices     Peripheral Intravenous Line            Peripheral IV 01/28/21 Right Antecubital 1 day                Rationale for remaining devices: medical necessity  ---------------------------------------------------------------------------------------  Advance Directive and Living Will:      Power of :    POLST:    ---------------------------------------------------------------------------------------  Care Time Delivered:   No Critical Care time spent       JULISA Velásquez      Portions of the record may have been created with voice recognition software    Occasional wrong word or "sound a like" substitutions may have occurred due to the inherent limitations of voice recognition software    Read the chart carefully and recognize, using context, where substitutions have occurred

## 2021-01-31 VITALS
BODY MASS INDEX: 29.77 KG/M2 | OXYGEN SATURATION: 94 % | SYSTOLIC BLOOD PRESSURE: 112 MMHG | WEIGHT: 224.65 LBS | RESPIRATION RATE: 18 BRPM | DIASTOLIC BLOOD PRESSURE: 81 MMHG | HEART RATE: 82 BPM | TEMPERATURE: 98 F | HEIGHT: 73 IN

## 2021-01-31 PROCEDURE — 99239 HOSP IP/OBS DSCHRG MGMT >30: CPT | Performed by: INTERNAL MEDICINE

## 2021-01-31 RX ADMIN — APIXABAN 10 MG: 5 TABLET, FILM COATED ORAL at 08:35

## 2021-01-31 NOTE — PLAN OF CARE
Problem: Potential for Falls  Goal: Patient will remain free of falls  Description: INTERVENTIONS:  - Assess patient frequently for physical needs  -  Identify cognitive and physical deficits and behaviors that affect risk of falls    -  [place] fall precautions as indicated by assessment   - Educate patient/family on patient safety including physical limitations  - Instruct patient to call for assistance with activity based on assessment  - Modify environment to reduce risk of injury  - Consider OT/PT consult to assist with strengthening/mobility  Outcome: Progressing     Problem: Prexisting or High Potential for Compromised Skin Integrity  Goal: Skin integrity is maintained or improved  Description: INTERVENTIONS:  - Identify patients at risk for skin breakdown  - Assess and monitor skin integrity  - Assess and monitor nutrition and hydration status  - Monitor labs   - Assess for incontinence   - Turn and reposition patient  - Assist with mobility/ambulation  - Relieve pressure over bony prominences  - Avoid friction and shearing  - Provide appropriate hygiene as needed including keeping skin clean and dry  - Evaluate need for skin moisturizer/barrier cream  - Collaborate with interdisciplinary team   - Patient/family teaching  - Consider wound care consult   Outcome: Progressing     Problem: NEUROSENSORY - ADULT  Goal: Achieves stable or improved neurological status  Description: INTERVENTIONS  - Monitor and report changes in neurological status  - Monitor vital signs such as temperature, blood pressure, glucose, and any other labs ordered   - Initiate measures to prevent increased intracranial pressure  - Monitor for seizure activity and implement precautions if appropriate      Outcome: Progressing  Goal: Achieves maximal functionality and self care  Description: INTERVENTIONS  - Monitor swallowing and airway patency with patient fatigue and changes in neurological status  - Encourage and assist patient to increase activity and self care     - Encourage visually impaired, hearing impaired and aphasic patients to use assistive/communication devices  Outcome: Progressing     Problem: CARDIOVASCULAR - ADULT  Goal: Maintains optimal cardiac output and hemodynamic stability  Description: INTERVENTIONS:  - Monitor I/O, vital signs and rhythm  - Monitor for S/S and trends of decreased cardiac output  - Administer and titrate ordered vasoactive medications to optimize hemodynamic stability  - Assess quality of pulses, skin color and temperature  - Assess for signs of decreased coronary artery perfusion  - Instruct patient to report change in severity of symptoms  Outcome: Progressing  Goal: Absence of cardiac dysrhythmias or at baseline rhythm  Description: INTERVENTIONS:  - Continuous cardiac monitoring, vital signs, obtain 12 lead EKG if ordered  - Administer antiarrhythmic and heart rate control medications as ordered  - Monitor electrolytes and administer replacement therapy as ordered  Outcome: Progressing     Problem: RESPIRATORY - ADULT  Goal: Achieves optimal ventilation and oxygenation  Description: INTERVENTIONS:  - Assess for changes in respiratory status  - Assess for changes in mentation and behavior  - Position to facilitate oxygenation and minimize respiratory effort  - Oxygen administered by appropriate delivery if ordered  - Initiate smoking cessation education as indicated  - Encourage broncho-pulmonary hygiene including cough, deep breathe, Incentive Spirometry  - Assess the need for suctioning and aspirate as needed  - Assess and instruct to report SOB or any respiratory difficulty  - Respiratory Therapy support as indicated  Outcome: Progressing     Problem: GASTROINTESTINAL - ADULT  Goal: Minimal or absence of nausea and/or vomiting  Description: INTERVENTIONS:  - Administer IV fluids if ordered to ensure adequate hydration  - Maintain NPO status until nausea and vomiting are resolved  - Nasogastric tube if ordered  - Administer ordered antiemetic medications as needed  - Provide nonpharmacologic comfort measures as appropriate  - Advance diet as tolerated, if ordered  - Consider nutrition services referral to assist patient with adequate nutrition and appropriate food choices  Outcome: Progressing  Goal: Maintains or returns to baseline bowel function  Description: INTERVENTIONS:  - Assess bowel function  - Encourage oral fluids to ensure adequate hydration  - Administer IV fluids if ordered to ensure adequate hydration  - Administer ordered medications as needed  - Encourage mobilization and activity  - Consider nutritional services referral to assist patient with adequate nutrition and appropriate food choices  Outcome: Progressing  Goal: Maintains adequate nutritional intake  Description: INTERVENTIONS:  - Monitor percentage of each meal consumed  - Identify factors contributing to decreased intake, treat as appropriate  - Assist with meals as needed  - Monitor I&O, weight, and lab values if indicated  - Obtain nutrition services referral as needed  Outcome: Progressing     Problem: GENITOURINARY - ADULT  Goal: Maintains or returns to baseline urinary function  Description: INTERVENTIONS:  - Assess urinary function  - Encourage oral fluids to ensure adequate hydration if ordered  - Administer IV fluids as ordered to ensure adequate hydration  - Administer ordered medications as needed  - Offer frequent toileting  - Follow urinary retention protocol if ordered  Outcome: Progressing  Goal: Absence of urinary retention  Description: INTERVENTIONS:  - Assess patients ability to void and empty bladder  - Monitor I/O  - Bladder scan as needed  - Discuss with physician/AP medications to alleviate retention as needed  - Discuss catheterization for long term situations as appropriate  Outcome: Progressing     Problem: METABOLIC, FLUID AND ELECTROLYTES - ADULT  Goal: Electrolytes maintained within normal limits  Description: INTERVENTIONS:  - Monitor labs and assess patient for signs and symptoms of electrolyte imbalances  - Administer electrolyte replacement as ordered  - Monitor response to electrolyte replacements, including repeat lab results as appropriate  - Instruct patient on fluid and nutrition as appropriate  Outcome: Progressing  Goal: Fluid balance maintained  Description: INTERVENTIONS:  - Monitor labs   - Monitor I/O and WT  - Instruct patient on fluid and nutrition as appropriate  - Assess for signs & symptoms of volume excess or deficit  Outcome: Progressing  Goal: Glucose maintained within target range  Description: INTERVENTIONS:  - Monitor Blood Glucose as ordered  - Assess for signs and symptoms of hyperglycemia and hypoglycemia  - Administer ordered medications to maintain glucose within target range  - Assess nutritional intake and initiate nutrition service referral as needed  Outcome: Progressing     Problem: SKIN/TISSUE INTEGRITY - ADULT  Goal: Skin integrity remains intact  Description: INTERVENTIONS  - Identify patients at risk for skin breakdown  - Assess and monitor skin integrity  - Assess and monitor nutrition and hydration status  - Monitor labs (i e  albumin)  - Assess for incontinence   - Turn and reposition patient  - Assist with mobility/ambulation  - Relieve pressure over bony prominences  - Avoid friction and shearing  - Provide appropriate hygiene as needed including keeping skin clean and dry  - Evaluate need for skin moisturizer/barrier cream  - Collaborate with interdisciplinary team (i e  Nutrition, Rehabilitation, etc )   - Patient/family teaching  Outcome: Progressing  Goal: Oral mucous membranes remain intact  Description: INTERVENTIONS  - Assess oral mucosa and hygiene practices  - Implement preventative oral hygiene regimen  - Implement oral medicated treatments as ordered  - Initiate Nutrition services referral as needed  Outcome: Progressing     Problem: HEMATOLOGIC - ADULT  Goal: Maintains hematologic stability  Description: INTERVENTIONS  - Assess for signs and symptoms of bleeding or hemorrhage  - Monitor labs  - Administer supportive blood products/factors as ordered and appropriate  Outcome: Progressing     Problem: MUSCULOSKELETAL - ADULT  Goal: Maintain or return mobility to safest level of function  Description: INTERVENTIONS:  - Assess patient's ability to carry out ADLs; assess patient's baseline for ADL function and identify physical deficits which impact ability to perform ADLs (bathing, care of mouth/teeth, toileting, grooming, dressing, etc )  - Assess/evaluate cause of self-care deficits   - Assess range of motion  - Assess patient's mobility  - Assess patient's need for assistive devices and provide as appropriate  - Encourage maximum independence but intervene and supervise when necessary  - Involve family in performance of ADLs  - Assess for home care needs following discharge   - Consider OT consult to assist with ADL evaluation and planning for discharge  - Provide patient education as appropriate  Outcome: Progressing  Goal: Maintain proper alignment of affected body part  Description: INTERVENTIONS:  - Support, maintain and protect limb and body alignment  - Provide patient/ family with appropriate education  Outcome: Progressing

## 2021-01-31 NOTE — PLAN OF CARE
Problem: Potential for Falls  Goal: Patient will remain free of falls  Description: INTERVENTIONS:  - Assess patient frequently for physical needs  -  Identify cognitive and physical deficits and behaviors that affect risk of falls    -  [place] fall precautions as indicated by assessment   - Educate patient/family on patient safety including physical limitations  - Instruct patient to call for assistance with activity based on assessment  - Modify environment to reduce risk of injury  - Consider OT/PT consult to assist with strengthening/mobility  Outcome: Progressing     Problem: Prexisting or High Potential for Compromised Skin Integrity  Goal: Skin integrity is maintained or improved  Description: INTERVENTIONS:  - Identify patients at risk for skin breakdown  - Assess and monitor skin integrity  - Assess and monitor nutrition and hydration status  - Monitor labs   - Assess for incontinence   - Turn and reposition patient  - Assist with mobility/ambulation  - Relieve pressure over bony prominences  - Avoid friction and shearing  - Provide appropriate hygiene as needed including keeping skin clean and dry  - Evaluate need for skin moisturizer/barrier cream  - Collaborate with interdisciplinary team   - Patient/family teaching  - Consider wound care consult   Outcome: Progressing     Problem: NEUROSENSORY - ADULT  Goal: Achieves stable or improved neurological status  Description: INTERVENTIONS  - Monitor and report changes in neurological status  - Monitor vital signs such as temperature, blood pressure, glucose, and any other labs ordered   - Initiate measures to prevent increased intracranial pressure  - Monitor for seizure activity and implement precautions if appropriate      Outcome: Progressing  Goal: Achieves maximal functionality and self care  Description: INTERVENTIONS  - Monitor swallowing and airway patency with patient fatigue and changes in neurological status  - Encourage and assist patient to increase activity and self care     - Encourage visually impaired, hearing impaired and aphasic patients to use assistive/communication devices  Outcome: Progressing     Problem: CARDIOVASCULAR - ADULT  Goal: Maintains optimal cardiac output and hemodynamic stability  Description: INTERVENTIONS:  - Monitor I/O, vital signs and rhythm  - Monitor for S/S and trends of decreased cardiac output  - Administer and titrate ordered vasoactive medications to optimize hemodynamic stability  - Assess quality of pulses, skin color and temperature  - Assess for signs of decreased coronary artery perfusion  - Instruct patient to report change in severity of symptoms  Outcome: Progressing  Goal: Absence of cardiac dysrhythmias or at baseline rhythm  Description: INTERVENTIONS:  - Continuous cardiac monitoring, vital signs, obtain 12 lead EKG if ordered  - Administer antiarrhythmic and heart rate control medications as ordered  - Monitor electrolytes and administer replacement therapy as ordered  Outcome: Progressing     Problem: RESPIRATORY - ADULT  Goal: Achieves optimal ventilation and oxygenation  Description: INTERVENTIONS:  - Assess for changes in respiratory status  - Assess for changes in mentation and behavior  - Position to facilitate oxygenation and minimize respiratory effort  - Oxygen administered by appropriate delivery if ordered  - Initiate smoking cessation education as indicated  - Encourage broncho-pulmonary hygiene including cough, deep breathe, Incentive Spirometry  - Assess the need for suctioning and aspirate as needed  - Assess and instruct to report SOB or any respiratory difficulty  - Respiratory Therapy support as indicated  Outcome: Progressing     Problem: GASTROINTESTINAL - ADULT  Goal: Minimal or absence of nausea and/or vomiting  Description: INTERVENTIONS:  - Administer IV fluids if ordered to ensure adequate hydration  - Maintain NPO status until nausea and vomiting are resolved  - Nasogastric tube if ordered  - Administer ordered antiemetic medications as needed  - Provide nonpharmacologic comfort measures as appropriate  - Advance diet as tolerated, if ordered  - Consider nutrition services referral to assist patient with adequate nutrition and appropriate food choices  Outcome: Progressing  Goal: Maintains or returns to baseline bowel function  Description: INTERVENTIONS:  - Assess bowel function  - Encourage oral fluids to ensure adequate hydration  - Administer IV fluids if ordered to ensure adequate hydration  - Administer ordered medications as needed  - Encourage mobilization and activity  - Consider nutritional services referral to assist patient with adequate nutrition and appropriate food choices  Outcome: Progressing  Goal: Maintains adequate nutritional intake  Description: INTERVENTIONS:  - Monitor percentage of each meal consumed  - Identify factors contributing to decreased intake, treat as appropriate  - Assist with meals as needed  - Monitor I&O, weight, and lab values if indicated  - Obtain nutrition services referral as needed  Outcome: Progressing     Problem: GENITOURINARY - ADULT  Goal: Maintains or returns to baseline urinary function  Description: INTERVENTIONS:  - Assess urinary function  - Encourage oral fluids to ensure adequate hydration if ordered  - Administer IV fluids as ordered to ensure adequate hydration  - Administer ordered medications as needed  - Offer frequent toileting  - Follow urinary retention protocol if ordered  Outcome: Progressing  Goal: Absence of urinary retention  Description: INTERVENTIONS:  - Assess patients ability to void and empty bladder  - Monitor I/O  - Bladder scan as needed  - Discuss with physician/AP medications to alleviate retention as needed  - Discuss catheterization for long term situations as appropriate  Outcome: Progressing     Problem: METABOLIC, FLUID AND ELECTROLYTES - ADULT  Goal: Electrolytes maintained within normal limits  Description: INTERVENTIONS:  - Monitor labs and assess patient for signs and symptoms of electrolyte imbalances  - Administer electrolyte replacement as ordered  - Monitor response to electrolyte replacements, including repeat lab results as appropriate  - Instruct patient on fluid and nutrition as appropriate  Outcome: Progressing  Goal: Fluid balance maintained  Description: INTERVENTIONS:  - Monitor labs   - Monitor I/O and WT  - Instruct patient on fluid and nutrition as appropriate  - Assess for signs & symptoms of volume excess or deficit  Outcome: Progressing  Goal: Glucose maintained within target range  Description: INTERVENTIONS:  - Monitor Blood Glucose as ordered  - Assess for signs and symptoms of hyperglycemia and hypoglycemia  - Administer ordered medications to maintain glucose within target range  - Assess nutritional intake and initiate nutrition service referral as needed  Outcome: Progressing     Problem: SKIN/TISSUE INTEGRITY - ADULT  Goal: Skin integrity remains intact  Description: INTERVENTIONS  - Identify patients at risk for skin breakdown  - Assess and monitor skin integrity  - Assess and monitor nutrition and hydration status  - Monitor labs (i e  albumin)  - Assess for incontinence   - Turn and reposition patient  - Assist with mobility/ambulation  - Relieve pressure over bony prominences  - Avoid friction and shearing  - Provide appropriate hygiene as needed including keeping skin clean and dry  - Evaluate need for skin moisturizer/barrier cream  - Collaborate with interdisciplinary team (i e  Nutrition, Rehabilitation, etc )   - Patient/family teaching  Outcome: Progressing  Goal: Oral mucous membranes remain intact  Description: INTERVENTIONS  - Assess oral mucosa and hygiene practices  - Implement preventative oral hygiene regimen  - Implement oral medicated treatments as ordered  - Initiate Nutrition services referral as needed  Outcome: Progressing     Problem: HEMATOLOGIC - ADULT  Goal: Maintains hematologic stability  Description: INTERVENTIONS  - Assess for signs and symptoms of bleeding or hemorrhage  - Monitor labs  - Administer supportive blood products/factors as ordered and appropriate  Outcome: Progressing     Problem: MUSCULOSKELETAL - ADULT  Goal: Maintain or return mobility to safest level of function  Description: INTERVENTIONS:  - Assess patient's ability to carry out ADLs; assess patient's baseline for ADL function and identify physical deficits which impact ability to perform ADLs (bathing, care of mouth/teeth, toileting, grooming, dressing, etc )  - Assess/evaluate cause of self-care deficits   - Assess range of motion  - Assess patient's mobility  - Assess patient's need for assistive devices and provide as appropriate  - Encourage maximum independence but intervene and supervise when necessary  - Involve family in performance of ADLs  - Assess for home care needs following discharge   - Consider OT consult to assist with ADL evaluation and planning for discharge  - Provide patient education as appropriate  Outcome: Progressing  Goal: Maintain proper alignment of affected body part  Description: INTERVENTIONS:  - Support, maintain and protect limb and body alignment  - Provide patient/ family with appropriate education  Outcome: Progressing

## 2021-01-31 NOTE — NURSING NOTE
Pt ambulated with  ft    Pt c/o of dizziness and dyspnea while ambulating  During ambulation pt maintained oxygen saturation 93-96% room air

## 2021-01-31 NOTE — DISCHARGE SUMMARY
Discharge- Niru Soto 1965, 61 y o  male MRN: [id number]  Unit/Bed#: E4 -01 Encounter: [id number]  Primary Care Provider: No primary care provider on file  Date and time admitted to hospital: 1/29/2021  2:59 AM      Admitting Provider:  Clarence Matos DO  Discharge Provider:  Lora Childress DO  Admission Date: 1/29/2021       Discharge Date: 01/31/21   LOS: 2  Primary Care Physician at Discharge: No primary care provider on file  None    HOSPITAL COURSE:  Niru Soto is a 61 y o  male without a significant past medical history who presented initially to [facility] for cough and worsening shortness of breath  The patient thought he had COVID  In the emergency department he was found have extensive bilateral pulmonary embolism and due to RV strain was transferred here under critical care and started on heparin infusion  He remained stable and eventually was transitioned to Eliquis  Cost confirmed to be $50 /m or $125/3mon  Patient was evaluated on occasion during day of discharge and eventually felt well enough to go home  He denies any chest pain but still has some dyspnea on exertion which has much improved compared to time of admission    Please see problem list listed below  DISCHARGE DIAGNOSES  * Bilateral pulmonary embolism (HCC)  Assessment & Plan  · Bilateral pulmonary embolism with cor pulmonale/RV strain  · Was admitted to intensive care unit and transition from heparin infusion to Eliquis  · Tolerating normal exercise today and ADLs on medical floor  · Being discharged home with follow-up with cardiology and pulmonology given severe pulmonary hypertension  · Thirty day supply covered, sent prescriptions to mail order for 90 day supplies confirmed cost $125/3mon    Hyperglycemia  Assessment & Plan  · Hyperglycemia without history of diabetes mellitus      Results from last 7 days   Lab Units 01/29/21  0318   HEMOGLOBIN A1C % 6 3*     Results from last 7 days   Lab Units 01/30/21  0514 01/28/21 1947   GLUCOSE RANDOM mg/dL 134 202*         TANG (acute kidney injury) ([facility] [address] )  Assessment & Plan  · Mild TANG secondary to pulmonary embolism  Resolved    Results from last 7 days   Lab Units 01/30/21  0514 01/28/21 1947   BUN mg/dL 17 19   CREATININE mg/dL 1 01 1 65*   EGFR ml/min/1 73sq m 82 45       Elevated troponin  Assessment & Plan  · Non MI elevation troponin secondary to pulmonary embolism    Results from last 7 days   Lab Units 01/29/21  0858 01/29/21  0616 01/29/21  0319 01/28/21 1947   TROPONIN I ng/mL 0 17* 0 26* 0 32* 0 22*         Leukocytosis  Assessment & Plan  · Leukocytosis likely inflammatory response  No evidence of acute infection    Results from last 7 days   Lab Units 01/30/21  0514 01/29/21 0318 01/28/21 1947   WBC Thousand/uL 13 03* 15 17* 18 42*     CONSULTING PROVIDERS   IP CONSULT TO CASE MANAGEMENT    PROCEDURES PERFORMED  Echocardiogram  Date:  1/29/2021  SUMMARY  LEFT VENTRICLE:  The left ventricle was not well seen but appeared to have normal systolic function  Left ventricular size was normal  Left ventricular wall thickness was normal  No regional wall motion abnormalities were noted but could not be ruled out  RIGHT VENTRICLE:  The right ventricle was significantly enlarged and hypokinetic  The right ventricular free wall appeared near akinetic  There was better contractility at the apex consistent with Alvares's sign but not completely classical since apical  contractility was also reduced  PULMONARY ARTERIES:  Severe pulmonary hypertension  Pulmonary artery systolic pressure is estimated 78 mmHg  RADIOLOGY RESULTS  Cta Ed Chest Pe Study  Result Date: 1/28/2021  Impression: Extensive bilateral upper and lower lobar pulmonary emboli  Right ventricle is dilated consistent with right heart strain    I personally discussed this study with TONI A Telford Lundborg on 1/28/2021 at 10:56 PM  Workstation performed: [id number]       LABS  Results from last 7 days   Lab Units 01/30/21  0514 01/29/21  0319 01/29/21  0318 01/28/21  1947   WBC Thousand/uL 13 03*  --  15 17* 18 42*   HEMOGLOBIN g/dL 13 4  --  14 4 15 7   HEMATOCRIT % 42 0  --  44 5 47 4   MCV fL 90  --  88 86   PLATELETS Thousands/uL 153  --  183 228   INR   --  1 35*  --   --      Results from last 7 days   Lab Units 01/30/21  0514 01/28/21 1947   SODIUM mmol/L 142 140   POTASSIUM mmol/L 3 9 3 9   CHLORIDE mmol/L 109* 103   CO2 mmol/L 19* 22   BUN mg/dL 17 19   CREATININE mg/dL 1 01 1 65*   CALCIUM mg/dL 9 4 10 1   ALBUMIN g/dL  --  4 3   TOTAL BILIRUBIN mg/dL  --  1 15   ALK PHOS U/L  --  84 7   ALT U/L  --  14   AST U/L  --  16   EGFR ml/min/1 73sq m 82 45   GLUCOSE RANDOM mg/dL 134 202*     Results from last 7 days   Lab Units 01/29/21  0858 01/29/21  0616 01/29/21 0319   TROPONIN I ng/mL 0 17* 0 26* 0 32*     Results from last 7 days   Lab Units 01/29/21  0319   NT-PRO BNP pg/mL 7,113*      Results from last 7 days   Lab Units 01/28/21 1947   D-DIMER QUANTITATIVE mg/L FEU 10 68*         Results from last 7 days   Lab Units 01/29/21  0318 01/28/21 1947   HEMOGLOBIN A1C % 6 3* 6 3*         Results from last 7 days   Lab Units 01/29/21  0616 01/29/21 0321 01/28/21 2231 01/28/21 2029   LACTIC ACID mmol/L 2 1* 2 7* 2 1* 2 3*            Results from last 7 days   Lab Units 01/29/21  0621 01/29/21  0616 01/28/21 2029 01/28/21 1947   BLOOD CULTURE  No Growth at 24 hrs  No Growth at 24 hrs  No Growth at 48 hrs  No Growth at 48 hrs   --    INFLUENZA A PCR   --   --   --  Negative           DISCHARGE DAY VISIT AND PHYSICAL EXAM:  Subjective:  Patient seen examined initially in the morning and then in the afternoon  After having several bowel movements in walking the halls he feels much better      Vitals:   Blood Pressure: 112/81 (01/31/21 0803)  Pulse: 82 (01/31/21 0803)  Temperature: 98 °F (36 7 °C) (01/31/21 0803)  Temp Source: Temporal (01/31/21 0803)  Respirations: 18 (01/31/21 0803)  Height: 6' 1" (185 4 cm) (01/29/21 0400)  Weight - Scale: 102 kg (224 lb 10 4 oz) (01/31/21 0600)  SpO2: 94 % (01/31/21 0803)    Physical Exam  Vitals signs reviewed  Constitutional:       General: He is not in acute distress  HENT:      Head: Atraumatic  Cardiovascular:      Rate and Rhythm: Regular rhythm  Heart sounds: Normal heart sounds  Pulmonary:      Effort: Pulmonary effort is normal       Breath sounds: Decreased breath sounds present  No wheezing  Abdominal:      General: Bowel sounds are normal       Palpations: Abdomen is soft  Tenderness: There is no abdominal tenderness  There is no rebound  Musculoskeletal:         General: No swelling or tenderness  Skin:     General: Skin is warm  Neurological:      General: No focal deficit present  Mental Status: He is alert and oriented to person, place, and time  Cranial Nerves: No cranial nerve deficit  Psychiatric:         Mood and Affect: Mood normal        Planned Re-admission:  No  Discharge Disposition:  Home    Test Results Pending at Discharge:   Pending Labs     Order Current Status    Blood culture Preliminary result    Blood culture Preliminary result      Incidental findings:  None    Medications   · Discharge Medication List: See after visit summary for reconciled discharge medications  Diet restrictions:  Regular diet   Activity restrictions: No strenuous activity  Discharge Condition: stable    Outpatient Follow-Up and Discharge Instructions  See after visit summary section titled Discharge Instructions for information provided to patient and family  Code Status: Level 1 - Full Code  Discharge Statement   I spent 35 minutes discharging the patient  This time was spent on the day of discharge  Greater than 50% of total time was spent with the patient and / or family counseling and / or coordination of care  ** Please Note: This note has been constructed using a voice recognition system   **

## 2021-02-01 NOTE — ASSESSMENT & PLAN NOTE
· Mild TANG secondary to pulmonary embolism    Resolved    Results from last 7 days   Lab Units 01/30/21  0514 01/28/21  1947   BUN mg/dL 17 19   CREATININE mg/dL 1 01 1 65*   EGFR ml/min/1 73sq m 82 45

## 2021-02-01 NOTE — ASSESSMENT & PLAN NOTE
· Leukocytosis likely inflammatory response    No evidence of acute infection    Results from last 7 days   Lab Units 01/30/21  0514 01/29/21  0318 01/28/21  1947   WBC Thousand/uL 13 03* 15 17* 18 42*

## 2021-02-01 NOTE — ASSESSMENT & PLAN NOTE
· Hyperglycemia without history of diabetes mellitus      Results from last 7 days   Lab Units 01/29/21  0318   HEMOGLOBIN A1C % 6 3*     Results from last 7 days   Lab Units 01/30/21  0514 01/28/21  1947   GLUCOSE RANDOM mg/dL 134 202*

## 2021-02-01 NOTE — ASSESSMENT & PLAN NOTE
· Non MI elevation troponin secondary to pulmonary embolism    Results from last 7 days   Lab Units 01/29/21  0858 01/29/21  0616 01/29/21  0319 01/28/21  1947   TROPONIN I ng/mL 0 17* 0 26* 0 32* 0 22*

## 2021-02-01 NOTE — ASSESSMENT & PLAN NOTE
· Bilateral pulmonary embolism with cor pulmonale/RV strain  · Was admitted to intensive care unit and transition from heparin infusion to Eliquis  · Tolerating normal exercise today and ADLs on medical floor  · Being discharged home with follow-up with cardiology and pulmonology given severe pulmonary hypertension    · Thirty day supply covered, sent prescriptions to mail order for 90 day supplies confirmed cost $125/3mon

## 2021-02-02 LAB
BACTERIA BLD CULT: NORMAL
BACTERIA BLD CULT: NORMAL

## 2021-02-03 LAB
ATRIAL RATE: 89 BPM
BACTERIA BLD CULT: NORMAL
BACTERIA BLD CULT: NORMAL
P AXIS: 82 DEGREES
PR INTERVAL: 137 MS
QRS AXIS: 54 DEGREES
QRSD INTERVAL: 94 MS
QT INTERVAL: 369 MS
QTC INTERVAL: 449 MS
T WAVE AXIS: -87 DEGREES
VENTRICULAR RATE: 89 BPM

## 2021-02-03 PROCEDURE — 93010 ELECTROCARDIOGRAM REPORT: CPT | Performed by: INTERNAL MEDICINE

## 2021-02-09 ENCOUNTER — OFFICE VISIT (OUTPATIENT)
Dept: INTERNAL MEDICINE CLINIC | Facility: CLINIC | Age: 56
End: 2021-02-09
Payer: COMMERCIAL

## 2021-02-09 VITALS
BODY MASS INDEX: 30.22 KG/M2 | WEIGHT: 228 LBS | DIASTOLIC BLOOD PRESSURE: 68 MMHG | SYSTOLIC BLOOD PRESSURE: 108 MMHG | HEART RATE: 88 BPM | HEIGHT: 73 IN | OXYGEN SATURATION: 98 % | TEMPERATURE: 99.1 F

## 2021-02-09 DIAGNOSIS — Z12.9 CANCER SCREENING: ICD-10-CM

## 2021-02-09 DIAGNOSIS — R73.03 PREDIABETES: ICD-10-CM

## 2021-02-09 DIAGNOSIS — I26.99 BILATERAL PULMONARY EMBOLISM (HCC): Primary | ICD-10-CM

## 2021-02-09 PROCEDURE — 99495 TRANSJ CARE MGMT MOD F2F 14D: CPT | Performed by: INTERNAL MEDICINE

## 2021-02-09 NOTE — ASSESSMENT & PLAN NOTE
·  HbA1c 6 3 on 1/28 which puts the patient in the prediabetic range    ·   After discussion with the patient he prefers to hold on starting any medication and focus on lifestyle modifications on form of( exercise, diet)  ·  information about diabetic diet and dash diet with added to the patient paperwork to review  ·  check lipid profile  · repeat HbA1c in 3 months

## 2021-02-09 NOTE — PROGRESS NOTES
Assessment/Plan:    Bilateral pulmonary embolism (HCC)  · Confirmed on CTA on 1/28/2021, on Elquis   ·  patient's PE considered unprovoked so far, given the patient has no significant past medical history, he will need further evaluation to rule out malignancy or other hypercoagulable state to help evaluate his anticoagulation duration  ·   Family history with colon cancer in mother, testicular cancer with genetic abnormality in father  · ambulatory referral to Gastroenterology for colonoscopy in the future  ·  PSA level  ·  ultrasound scrotum and testicles  ·  thrombosis panel  ·  continue follow-up with cardiology and pulmonology for further evaluation       Pre-diabetes  ·  HbA1c 6 3 on 1/28 which puts the patient in the prediabetic range  ·   After discussion with the patient he prefers to hold on starting any medication and focus on lifestyle modifications on form of( exercise, diet)  ·  information about diabetic diet and dash diet with added to the patient paperwork to review  ·  check lipid profile  · repeat HbA1c in 3 months    Cancer screening  ·  Given the patient severe bilateral pulmonary embolism patient is wanting for extensive cancer screening also given his strong family history  ·   CT abdomen/ pelvis with contrast to rule out any intra-abdominal malignancy or masses  ·   PSA  ·  colonoscopy         Diagnoses and all orders for this visit:    Cancer screening  -     PSA, Total Screen; Future  -     Thrombosis Panel; Future  -     Ambulatory referral to Gastroenterology; Future  -     US scrotum and testicles; Future  -     CT abdomen pelvis w contrast; Future    Prediabetes  -     Lipid panel; Future  -     Hemoglobin A1C; Future          Subjective:      Patient ID: Kassandra Britt is a 61 y o  male  Was been evaluated today at the clinic following his hospital stay    Patient does not have significant past medical history was present to the hospital 01/28/2021 complain of shortness of breath, at the time is blood work showed remarkable elevated D-dimer wanted CTA to rule out PE, he CTA showed severe bilateral pulmonary embolism  Echocardiogram during his hospital stay showed severe right heart strain with severe pulmonary hypertension  Patient was started on anticoagulation with Eliquis  His been reported significant improvement, reported mild intermittent shortness of breath, denies any chest pain, fevers, chills, lightheadedness, tingling, numbness, weakness  Patient is currently scheduled to be evaluated by Cardiology and pulmonology  The following portions of the patient's history were reviewed and updated as appropriate: allergies, current medications, past family history, past medical history, past social history, past surgical history and problem list     Review of Systems   Constitutional: Positive for activity change  Negative for appetite change, diaphoresis, fatigue and unexpected weight change  HENT: Negative for congestion  Eyes: Negative for visual disturbance  Respiratory: Positive for chest tightness and shortness of breath  Negative for cough and wheezing  Cardiovascular: Negative for chest pain, palpitations and leg swelling  Gastrointestinal: Negative for abdominal distention, constipation, diarrhea, nausea and vomiting  Musculoskeletal: Negative for myalgias  Neurological: Negative for dizziness, speech difficulty, weakness, light-headedness and headaches  Psychiatric/Behavioral: Negative for confusion  The patient is not nervous/anxious  Objective:      /68 (BP Location: Left arm, Patient Position: Sitting, Cuff Size: Large)   Pulse 88   Temp 99 1 °F (37 3 °C)   Ht 6' 1" (1 854 m)   Wt 103 kg (228 lb)   SpO2 98%   BMI 30 08 kg/m²          Physical Exam  Vitals signs reviewed  Constitutional:       General: He is not in acute distress  Appearance: Normal appearance  He is not ill-appearing  HENT:      Head: Normocephalic  Eyes:      Extraocular Movements: Extraocular movements intact  Pupils: Pupils are equal, round, and reactive to light  Cardiovascular:      Rate and Rhythm: Normal rate and regular rhythm  Heart sounds: No murmur  No gallop  Pulmonary:      Effort: Pulmonary effort is normal  No respiratory distress  Breath sounds: Normal breath sounds  No wheezing  Abdominal:      General: Abdomen is flat  Bowel sounds are normal  There is no distension  Palpations: Abdomen is soft  Tenderness: There is no abdominal tenderness  There is no guarding  Musculoskeletal: Normal range of motion  General: No swelling or tenderness  Skin:     General: Skin is warm and dry  Capillary Refill: Capillary refill takes less than 2 seconds  Neurological:      General: No focal deficit present  Mental Status: He is alert and oriented to person, place, and time  Psychiatric:         Mood and Affect: Mood normal          Behavior: Behavior normal          BMI Counseling: Body mass index is 30 08 kg/m²  The BMI is above normal  Nutrition recommendations include decreasing fast food intake, consuming healthier snacks, limiting drinks that contain sugar, moderation in carbohydrate intake and reducing intake of saturated and trans fat  Exercise recommendations include moderate physical activity 150 minutes/week and exercising 3-5 times per week  No pharmacotherapy was ordered  Handout about the diabetic diet and the dash diet were provided

## 2021-02-09 NOTE — PATIENT INSTRUCTIONS
DASH Eating Plan   WHAT YOU NEED TO KNOW:   The DASH (Dietary Approaches to Stop Hypertension) Eating Plan is designed to help prevent or lower high blood pressure  It can also help to lower LDL (bad) cholesterol and decrease your risk of heart disease  The plan is low in sodium, sugar, unhealthy fats, and total fat  It is high in potassium, calcium, magnesium, and fiber  These nutrients are added when you eat more fruits, vegetables, and whole grains  DISCHARGE INSTRUCTIONS:   Your sodium limit each day: Your dietitian will tell you how much sodium is safe for you to have each day  People with high blood pressure should have no more than 1,500 to 2,300 mg of sodium in a day  A teaspoon (tsp) of salt has 2,300 mg of sodium  This may seem like a difficult goal, but small changes to the foods you eat can make a big difference  Your healthcare provider or dietitian can help you create a meal plan that follows your sodium limit  How to limit sodium:   · Read food labels  Food labels can help you choose foods that are low in sodium  The amount of sodium is listed in milligrams (mg)  The % Daily Value (DV) column tells you how much of your daily needs are met by 1 serving of the food for each nutrient listed  Choose foods that have less than 5% of the DV of sodium  These foods are considered low in sodium  Foods that have 20% or more of the DV of sodium are considered high in sodium  Avoid foods that have more than 300 mg of sodium in each serving  Choose foods that say low-sodium, reduced-sodium, or no salt added on the food label  · Avoid salt  Do not salt food at the table, and add very little salt to foods during cooking  Use herbs and spices, such as onions, garlic, and salt-free seasonings to add flavor to foods  Try lemon or lime juice or vinegar to give foods a tart flavor  Use hot peppers or a small amount of hot pepper sauce to add a spicy flavor to foods  · Ask about salt substitutes    Ask your healthcare provider if you may use salt substitutes  Some salt substitutes have ingredients that can be harmful if you have certain health conditions  · Choose foods carefully at restaurants  Meals from restaurants, especially fast food restaurants, are often high in sodium  Some restaurants have nutrition information that tells you the amount of sodium in their foods  Ask to have your food prepared with less, or no salt  What you need to know about fats:   · Include healthy fats  Examples are unsaturated fats and omega-3 fatty acids  Unsaturated fats are found in soybean, canola, olive, or sunflower oil, and liquid and soft tub margarines  Omega-3 fatty acids are found in fatty fish, such as salmon, tuna, mackerel, and sardines  It is also found in flaxseed oil and ground flaxseed  · Avoid unhealthy fats  Do not eat unhealthy fats, such as saturated fats and trans fats  Saturated fats are found in foods that contain fat from animals  Examples are fatty meats, whole milk, butter, cream, and other dairy foods  It is also found in shortening, stick margarine, palm oil, and coconut oil  Trans fats are found in fried foods, crackers, chips, and baked goods made with margarine or shortening  Foods to include: With the DASH eating plan, you need to eat a certain number of servings from each food group  This will help you get enough of certain nutrients and limit others  The amount of servings you should eat depends on how many calories you need  Your dietitian can tell you how many calories you need  The number of servings listed next to the food groups below are for people who need about 2,000 calories each day  · Grains:  6 to 8 servings (3 of these servings should be whole-grain foods)    ? 1 slice of whole-grain bread     ? 1 ounce of dry cereal    ? ½ cup of cooked cereal, pasta, or brown rice    · Vegetables and fruits:  4 to 5 servings of fruits and 4 to 5 servings of vegetables    ?  1 medium fruit    ? ½ cup of frozen, canned (no added salt), or chopped fresh vegetables     ? ½ cup of fresh, frozen, dried, or canned fruit (canned in light syrup or fruit juice)    ? ½ cup of vegetable or fruit juice    · Dairy:  2 to 3 servings    ? 1 cup of nonfat (skim) or 1% milk    ? 1½ ounces of fat-free or low-fat cheese    ? 6 ounces of nonfat or low-fat yogurt    · Lean meat, poultry, and fish:  6 ounces or less    ? Poultry (chicken, turkey) with no skin    ? Fish (especially fatty fish, such as salmon, fresh tuna, or mackerel)    ? Lean beef and pork (loin, round, extra lean hamburger)    ? Egg whites and egg substitutes    · Nuts, seeds, and legumes:  4 to 5 servings each week    ? ½ cup of cooked beans and peas    ? 1½ ounces of unsalted nuts    ? 2 tablespoons of peanut butter or seeds    · Sweets and added sugars:  5 or less each week    ? 1 tablespoon of sugar, jelly, or jam    ? ½ cup of sorbet or gelatin    ? 1 cup of lemonade    · Fats:  2 to 3 servings each week    ? 1 teaspoon of soft margarine or vegetable oil    ? 1 tablespoon of mayonnaise    ? 2 tablespoons of salad dressing    Foods to avoid:   · Grains:      ? Baked goods, such as doughnuts, pastries, cookies, and biscuits (high in fat and sugar)    ? Mixes for cornbread and biscuits, packaged foods, such as bread stuffing, rice and pasta mixes, macaroni and cheese, and instant cereals (high in sodium)    · Fruits and vegetables:      ? Regular, canned vegetables (high in sodium)    ? Sauerkraut, pickled vegetables, and other foods prepared in brine (high in sodium)    ? Fried vegetables or vegetables in butter or high-fat sauces    ? Fruit in cream or butter sauce (high in fat)    · Dairy:      ? Whole milk, 2% milk, and cream (high in fat)    ?  Regular cheese and processed cheese (high in fat and sodium)    · Meats and protein foods:      ? Smoked or cured meat, such as corned beef, martinez, ham, hot dogs, and sausage (high in fat and sodium)    ? Canned beans and canned meats or spreads, such as potted meats, sardines, anchovies, and imitation seafood (high in sodium)    ? Deli or lunch meats, such as bologna, ham, turkey, and roast beef (high in sodium)    ? High-fat meat (T-bone steak, regular hamburger, and ribs)    ? Whole eggs and egg yolks (high in fat)    · Other:      ? Seasonings made with salt, such as garlic salt, celery salt, onion salt, seasoned salt, meat tenderizers, and monosodium glutamate (MSG)    ? Miso soup and canned or dried soup mixes (high in sodium)    ? Regular soy sauce, barbecue sauce, teriyaki sauce, steak sauce, Worcestershire sauce, and most flavored vinegars (high in sodium)    ? Regular condiments, such as mustard, ketchup, and salad dressings (high in sodium)    ? Gravy and sauces, such as Pedrito or cheese sauces (high in sodium and fat)    ? Drinks high in sugar, such as soda or fruit drinks    ? Snack foods, such as salted chips, popcorn, pretzels, pork rinds, salted crackers, and salted nuts    ? Frozen foods, such as dinners, entrees, vegetables with sauces, and breaded meats (high in sodium)    Other guidelines to follow:   · Maintain a healthy weight  Your risk for heart disease is higher if you are overweight  Your healthcare provider may suggest that you lose weight if you are overweight  You can lose weight by eating fewer calories and foods that have added sugars and fat  The DASH meal plan can help you do this  Decrease calories by eating smaller portions at each meal and fewer snacks  Ask your healthcare provider for more information about how to lose weight  · Exercise regularly  Regular exercise can help you reach or maintain a healthy weight  Regular exercise can also help decrease your blood pressure and improve your cholesterol levels  Get 30 minutes or more of moderate exercise each day of the week  To lose weight, get at least 60 minutes of exercise   Talk to your healthcare provider about the best exercise program for you  · Limit alcohol  Women should limit alcohol to 1 drink a day  Men should limit alcohol to 2 drinks a day  A drink of alcohol is 12 ounces of beer, 5 ounces of wine, or 1½ ounces of liquor  © Copyright [address] Information is for End User's use only and may not be sold, redistributed or otherwise used for commercial purposes  All illustrations and images included in CareNotes® are the copyrighted property of A D A M , Inc  or [facility] Gautam   The above information is an  only  It is not intended as medical advice for individual conditions or treatments  Talk to your doctor, nurse or pharmacist before following any medical regimen to see if it is safe and effective for you  Meal Planning with Diabetes Exchanges   AMBULATORY CARE:   Diabetes exchanges  are servings of food that contain similar amounts of carbohydrate, fat, protein, and calories within a food group  The exchanges can be used to develop a healthy meal plan that helps to keep your blood sugar within the recommended levels  A meal plan with the right amount of carbohydrates is especially important  Your blood sugar naturally rises after you eat carbohydrates  Too many carbohydrates in 1 meal or snack can raise your blood sugar level  Carbohydrates are found in starches, fruit, milk, yogurt, and sweets  Call your doctor if:   · You have high blood sugar levels during a certain time of day, or almost all of the time  · You often have low blood sugar levels  · You have questions or concerns about your condition or care  Create a meal plan with exchanges:  A dietitian will work with you to develop a healthy meal plan that is right for you  This meal plan will include the amount of exchanges you can have from each food group throughout the day  Follow your meal plan by keeping track of the amount of exchanges you eat for each meal and snack   Your meal plan will be based on your age, weight, blood sugar levels, medicine, and activity level  Starch food group exchanges:  Each exchange below contains about 15 grams of carbohydrate , 3 grams of protein, 1 gram of fat, and 80 calories  · 1 ounce of white, whole wheat or rye bread (1 slice)    · 1 ounce of bagel (about ¼ of a bagel)    · 1 6-inch flour or corn tortilla or 1 4-inch pancake (about ¼ inch thick)    · ? cup of cooked pasta or rice    · ¾ cup of dry, ready-to-eat cereal with no sugar added     · ½ cup of cooked cereal, such as oatmeal    · 3 joan cracker squares or 8 animal crackers    · 6 saltine-type crackers or     · 3 cups of popcorn or ¾ ounce of pretzels     · Starchy vegetables and cooked legumes:      ? ½ cup of corn, green peas, sweet potatoes, or mashed potatoes     ? ¼ of a large baked potato     ? 1 cup of acorn, butternut squash, or pumpkin     ? ½ cup of beans, lentils, or peas (such as forman, kidney, or black-eyed)    ? ? cup of lima beans    Fruit group exchanges:  Each exchange contains about 15 grams of carbohydrate  and 60 calories  · 1 small (4 ounce) apple, banana orange, or nectarine    · ½ cup of canned or fresh fruit    · ½ cup (4 ounces) of unsweetened fruit juice    · 2 tablespoons of dried fruit    Milk group exchanges:  Each exchange contains about 12 grams of carbohydrate  and 8 grams of protein  The amount of fat and calories in each serving depends on the type of milk (such as whole, low-fat, or fat-free)  · 1 cup fat-free or low-fat milk    · ¾ cup of plain, nonfat yogurt    · 1 cup fat-free, flavored yogurt with artificial (no calorie) sweetener    Non-starchy vegetable group exchanges:  Each exchange contains about 5 grams of carbohydrate , 2 grams of protein, and 25 calories  Examples include beets, broccoli, cabbage, carrots, cauliflower, cucumber, mushrooms, tomatoes, and zucchini    · ½ cup of cooked vegetables or 1 cup of raw vegetables     · ½ cup of vegetable juice    Meat and meat substitute group exchanges:  Each exchange of a lean meat  listed below contains about 7 grams of protein, 0 to 3 grams of fat, and 45 calories  The meat and meat substitutes food group does not contain any carbohydrates  Medium and high-fat meats have more calories  · 1 ounce of chicken or turkey without skin, or 1 ounce of fish (not breaded or fried)     · 1 ounce of lean beef, pork, or lamb     · 1-inch cube or 1 ounce of low-fat cheese     · 2 egg whites or ¼ cup of egg substitute     · ½ cup of tofu    Sweets, desserts, and other carbohydrate group exchanges:   · Sweets and other desserts:  Each exchange has about 15 grams of carbohydrate   ? 1 ounce of tiffanie food cake or 2-inch square cake (unfrosted)    ? 2 small cookies     ? ½ cup of sugar-free, fat-free ice cream    ? 1 tablespoon of syrup, jam, jelly, table sugar, or honey    · Combination foods:     ? 1 cup of an entrée, such as lasagna, spaghetti with meatballs, macaroni and cheese, and chili with beans (each serving counts as 2 carbohydrate exchanges )     ? 1 cup of tomato or vegetable beef soup (each serving counts as 1 carbohydrate exchange )    Fat group exchanges:  Each exchange contains 5 grams of fat and 45 calories  · 1 teaspoon of oil (such as canola, olive, or corn oil)     · 6 almonds or cashews, 10 peanuts, or 4 pecan halves     · 2 tablespoons of avocado     · ½ tablespoon of peanut butter     · 1 teaspoon of regular margarine or 2 teaspoons of low-fat margarine     · 1 teaspoon of regular butter or 1 tablespoon of low-fat butter     · 1 teaspoon of regular mayonnaise or 1 tablespoon of low-fat mayonnaise     · 1 tablespoon of regular salad dressing or 2 tablespoons of low-fat salad dressing    Free foods: The foods on this list are called free foods because they have very few calories  Free foods usually do not increase your blood sugar if you limit them    · 1 tablespoon of catsup or taco sauce     · ¼ cup of salsa     · 2 tablespoons of sugar-free syrup or 2 teaspoons of light jam or jelly     · 1 tablespoon of fat-free salad dressing     · 4 tablespoons of fat-free margarine or fat-free mayonnaise     · Sugar-free drinks: diet soda, sugar-free drink mixes, or mineral water     · Low-sodium bouillon or fat-free broth     · Mustard     · Seasonings such as spices, herbs, and garlic     · Sugar-free gelatin without added fruit    Other healthy nutrition guidelines:   · Limit drinks with sugar substitutes  Your dietitian or healthcare provider will encourage you to drink water  Water helps your kidneys to function properly  Ask how much water you should drink every day  · Eat more fiber  Choose foods that are good sources of fiber, such as fruits, vegetables, and whole grains  Cereals that contain 5 or more grams of fiber per serving are good sources of fiber  Legumes such as garbanzo, forman beans, kidney beans, and lentils are also good sources  · Limit fat  Ask your dietitian or healthcare provider how much fat you should eat each day  Choose foods low in fat, saturated fat, trans fat, and cholesterol  Examples include turkey or chicken without the skin, fish, lean cuts of meat, and beans  Low-fat dairy foods, such as low-fat or fat-free milk and low-fat yogurt are also good choices  Omega-3 fatty acids are healthy fats that are found in canola oil, soybean oil and fatty fish  Gore, albacore tuna, and sardines are good sources of omega 3 fatty acids  Eat 2 servings of these types of fish each week  Do not eat fried fish  · Limit sugar  Sugar and sweets must be counted toward the carbohydrate exchanges that you can have within your meal plan  Limit sugar and sweets because they are usually also high in calories and fat  Eat smaller portions of sweets by sharing a dessert or asking for a child-size portion at a restaurant  · Limit sodium  (salt) to about 2,300 mg per day   You may need to eat even less sodium if you have certain medical conditions  Foods high in sodium include soy sauce, potato chips, and soup  · Limit alcohol  Ask your healthcare provider if it is safe for you to drink alcohol  If alcohol is safe for you to have, eat a meal when you drink alcohol  If you drink alcohol on an empty stomach, your blood sugar may drop to a low level  Women 21 years or older and men 72 years or older should limit alcohol to 1 drink a day  Men aged 24 to 59 years should limit alcohol to 2 drinks a day  A drink of alcohol is 5 ounces of wine, 12 ounces of beer, or 1½ ounces of liquor  Other ways to manage your diabetes:   · Control your blood sugar level  Test your blood sugar level regularly and keep a record of the results  Ask your healthcare provider when and how often to test your blood sugar  You may need to check your blood sugar level at least 3 times each day  · Talk to your healthcare provider about your weight  Ask if you need to lose weight, and how much you need to lose  If you are overweight, you may need to make other changes to lose weight  Ask your healthcare provider to help you create a weight loss program      · Get regular physical activity  Physical activity can help decrease your blood sugar level  It can also help to decrease your risk for heart disease and help you lose weight  Adults should have moderate intensity physical activity for at least 150 minutes every week  Spread the amount of activity over at least 3 days a week  Do not skip more than 2 days in a row  Children should get at least 60 minutes of moderate physical activity on most days of the week  Examples of moderate physical activity include brisk walking, running, and swimming  Do not sit for longer than 30 minutes  Work with your healthcare provider to create a plan for physical activity      © Copyright [facility]– [facility] [address] Information is for End User's use only and may not be sold, redistributed or otherwise used for commercial purposes  All illustrations and images included in CareNotes® are the copyrighted property of A D A M , Inc  or [employer]  The above information is an  only  It is not intended as medical advice for individual conditions or treatments  Talk to your doctor, nurse or pharmacist before following any medical regimen to see if it is safe and effective for you

## 2021-02-09 NOTE — ASSESSMENT & PLAN NOTE
·  Given the patient severe bilateral pulmonary embolism patient is wanting for extensive cancer screening also given his strong family history  ·   CT abdomen/ pelvis with contrast to rule out any intra-abdominal malignancy or masses    ·   PSA  ·  colonoscopy

## 2021-02-09 NOTE — ASSESSMENT & PLAN NOTE
· Confirmed on CTA on 1/28/2021, on [place]   ·  patient's PE considered unprovoked so far, given the patient has no significant past medical history, he will need further evaluation to rule out malignancy or other hypercoagulable state to help evaluate his anticoagulation duration    ·   Family history with colon cancer in mother, testicular cancer with genetic abnormality in father  · ambulatory referral to Gastroenterology for colonoscopy in the future  ·  PSA level  ·  ultrasound scrotum and testicles  ·  thrombosis panel  ·  continue follow-up with cardiology and pulmonology for further evaluation

## 2021-02-11 NOTE — PROGRESS NOTES
General cardiology   Consult  Juanis Gonzales   61 y o    male   MRN: [id number]  [address]  [address]  [address] [place] [id number] [phone]    PCP: Ramona Warner MD  Cardiologist: will be Marvin Raza     referred by PCP   indication: Right heart strain              Summary of recommendations  Heart healthy diet  Educational information provided  lipid profile pending  Repeat echocardiogram 3 months  Follow up will be scheduled with Dr Jerene Nissen 3 months        Assessment/plan  Bilateral pulmonary emboli On Eliquis 5 mg b i d  workup per PCP  PSA, testicular ultrasound, thrombosis panel, referral to GI for colonoscopy, CT abdomen pelvis, lipid profile, all pending per PCP  He has an appointment with pulmonology today  non MI troponin elevation, to 0 32 secondary to bilateral PE  --EKG toay:  Sinus rhythm 75 beats per minute  PVCs  Nonspecific ST T-wave changes inferiorly and anteriorly  right heart strain secondary to bilateral pulmonary emboli- will repeat an echocardiogram in 3 months  severe pulmonary hypertension   abnormal EKG, likely due to demand ischemia from  extensive PE  Pre diabetes, HgA1c 6 3  He has made significant lifestyle changes  In his diet, and has lost weight as a result  CV screening  · Lipid profile- pending  Cardiac testing  --TTE 1/29/21  Left ventricle  Not well seen  Prior to have normal systolic function, LV size was normal   Wall thickness was normal   No  Obvious her regional motion abnormalities  The RV was significantly enlarged and hypokinetic  The RV free wall appeared near akinetic  Right atrial enlargement  Mild MR  Severe pulmonary hypertension, PASP estimated 78 mm Hg            HPI  Loretta Woo is a 60 yo male  Who is a lifelong nonsmoker  He has no chronic known medical problems    Adm 1/29-1/31/21with a dry cough and SOB; COVID-19 neg    Workup disclosed an elevated D-dimer   CTA of the chest showed extensive,  diffuse, bilateral pulmonary emboli, unprovoked  An echocardiogram showed right heart strain and severe pulmonary hypertension  He did have a troponin elevation to 0 32, felt to be related to his acute pulmonary emboli  NT proBNP elevated at 7113  He was placed on anticoagulation with Eliquis 5 mg b i d  He was also found have an elevated hemoglobin A1c of 6 3, consistent with pre diabetes  He has followed up with internal medicine after discharge  A CT of his chest and abdomen, testicular ultrasound and colonoscopy have been ordered, as well as a GI referral    FH Ma- colon cancer  She had a reported silent MI in her late 62s and  in her late 62s   Fa-testicular cancer    in his late 62s  He had a brother who  in his mid 62s from an unknown cause  SH lifelong nonsmoker  Rare alcohol  No illicit drugs  2/15/21    Cardiology consult, referred by his PCP for right heart strain  He continues on anticoagulation for his bilateral PE  He has modified his diet and made improvements, reducing his carbohydrates and sugars  He has lost about 10 lb  He denies chest pain or shortness of breath  He is a printer , 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  He can walk anywhere from 4-5 miles a day he tells me  He was able to do this around [place] time without any chest pain or shortness of breath  Currently, his shortness of breath is improving , although he feels he is not "back to himself"  He has no lower extremity edema  He denies lightheadedness or dizziness  blood pressure 110/70   O2 sat 98%   EKG: Sinus rhythm with PACs  Diffuse nonspecific ST T-wave changes inferiorly and anteriorl    Wt Readings from Last 3 Encounters:   02/15/21 99 7 kg (219 lb 12 8 oz)   21 103 kg (228 lb)   21 102 kg (224 lb 10 4 oz)      Case discussed briefly with Dr Suzi Orantes in the office  Will repeat an echocardiogram in 3 months    Ongoing cardiology follow-up recommended  Workup in progress for his bilateral PE, per his PCP      Assessment  Diagnoses and all orders for this visit:    Hospital discharge follow-up    Bilateral pulmonary embolism ([address] )  -     Echo complete with contrast if indicated; Future    Pre-diabetes    Cardiac ischemia  -     POCT ECG    Right ventricular dilation  -     Echo complete with contrast if indicated; Future          Past Medical History:   Diagnosis Date    Allergic        Review of Systems   Constitution: Positive for weight loss  Negative for chills  Cardiovascular: Positive for dyspnea on exertion  Negative for chest pain, claudication, cyanosis, irregular heartbeat, leg swelling, near-syncope, orthopnea, palpitations, paroxysmal nocturnal dyspnea and syncope  Respiratory: Negative for cough and shortness of breath  Gastrointestinal: Negative for heartburn and nausea  Neurological: Negative for dizziness, focal weakness, headaches, light-headedness and weakness  All other systems reviewed and are negative  No Known Allergies    Current Outpatient Medications:     [START ON 2/28/2021] apixaban (ELIQUIS) 5 mg, Take 1 tablet (5 mg total) by mouth 2 (two) times a day, Disp: 180 tablet, Rfl: 1    Ascorbic Acid (VITAMIN C PO), Take 250 mg by mouth daily , Disp: , Rfl:     VITAMIN D PO, Take 1,000 Units by mouth daily , Disp: , Rfl:     apixaban (ELIQUIS) 5 mg, Take 2 tablets (10 mg total) by mouth 2 (two) times a day for 7 days, THEN 1 tablet (5 mg total) 2 (two) times a day for 23 days   (Patient not taking: Reported on 2/15/2021), Disp: 74 tablet, Rfl: 0        Social History     Socioeconomic History    Marital status: /Civil Union     Spouse name: Not on file    Number of children: Not on file    Years of education: Not on file    Highest education level: Not on file   Occupational History    Not on file   Social Needs    Financial resource strain: Not hard at all   [employer] insecurity     Worry: Never true     Inability: Never true    Transportation needs     Medical: No     Non-medical: No   Tobacco Use    Smoking status: Never Smoker    Smokeless tobacco: Never Used   Substance and Sexual Activity    Alcohol use: Yes     Frequency: Monthly or less     Drinks per session: 1 or 2     Comment: occasionally    Drug use: Never    Sexual activity: Yes   Lifestyle    Physical activity     Days per week: 0 days     Minutes per session: Not on file    Stress: Only a little   Relationships    Social connections     Talks on phone: Three times a week     Gets together: Twice a week     Attends Anabaptism service: Never     Active member of club or organization: No     Attends meetings of clubs or organizations: Never     Relationship status:     Intimate partner violence     Fear of current or ex partner: No     Emotionally abused: No     Physically abused: No     Forced sexual activity: No   Other Topics Concern    Not on file   Social History Narrative    Not on file       Family History   Problem Relation Age of Onset    Heart disease Mother     Cancer Father        Physical Exam   Constitutional: He is oriented to person, place, and time  No distress  HENT:   Head: Normocephalic and atraumatic  Eyes: Conjunctivae and EOM are normal    Neck: Normal range of motion  Neck supple  Cardiovascular: Normal rate, regular rhythm, normal heart sounds and intact distal pulses  Pulmonary/Chest: Effort normal  He has decreased breath sounds in the left lower field  Abdominal: Soft  Bowel sounds are normal    Musculoskeletal: Normal range of motion  Neurological: He is alert and oriented to person, place, and time  Skin: Skin is warm and dry  Psychiatric: He has a normal mood and affect  Nursing note and vitals reviewed  Vitals: Blood pressure 110/70, pulse 81, height 6' 1" (1 854 m), weight 99 7 kg (219 lb 12 8 oz), SpO2 98 %     Wt Readings from Last 3 Encounters:   02/15/21 99 7 kg (219 lb 12 8 oz)   02/09/21 103 kg (228 lb)   01/31/21 102 kg (224 lb 10 4 oz)         Labs & Results:  Lab Results   Component Value Date    WBC 13 03 (H) 01/30/2021    HGB 13 4 01/30/2021    HCT 42 0 01/30/2021    MCV 90 01/30/2021     01/30/2021     No results found for: BNP  No components found for: CHEM  Troponin I   Date Value Ref Range Status   01/29/2021 0 17 (H) <=0 04 ng/mL Final     Comment:     [employer] Chemistry analyzer 99% cutoff is > 0 04 ng/mL in network labs     o cTnI 99% cutoff is useful only when applied to patients in the clinical setting of myocardial ischemia   o cTnI 99% cutoff should be interpreted in the context of clinical history, ECG findings and possibly cardiac imaging to establish correct diagnosis  o cTnI 99% cutoff may be suggestive but clearly not indicative of a coronary event without the clinical setting of myocardial ischemia  Results indicate test should be repeated on new specimen collected within 4-6 hours of the original   01/29/2021 0 26 (H) <=0 04 ng/mL Final     Comment:     [employer]ens Chemistry analyzer 99% cutoff is > 0 04 ng/mL in network labs     o cTnI 99% cutoff is useful only when applied to patients in the clinical setting of myocardial ischemia   o cTnI 99% cutoff should be interpreted in the context of clinical history, ECG findings and possibly cardiac imaging to establish correct diagnosis  o cTnI 99% cutoff may be suggestive but clearly not indicative of a coronary event without the clinical setting of myocardial ischemia      Results indicate test should be repeated on new specimen collected within 4-6 hours of the original   01/29/2021 0 32 (H) <=0 04 ng/mL Final     Comment:     S[employer]ens Chemistry analyzer 99% cutoff is > 0 04 ng/mL in network labs     o cTnI 99% cutoff is useful only when applied to patients in the clinical setting of myocardial ischemia   o cTnI 99% cutoff should be interpreted in the context of clinical history, ECG findings and possibly cardiac imaging to establish correct diagnosis  o cTnI 99% cutoff may be suggestive but clearly not indicative of a coronary event without the clinical setting of myocardial ischemia  Results indicate test should be repeated on new specimen collected within 4-6 hours of the original     Results for orders placed during the hospital encounter of 21   Echo complete with contrast if indicated    Narrative [address]  [place], [address]  [phone]    Transthoracic Echocardiogram  2D, M-mode, Doppler, and Color Doppler    Study date:  2021    Patient: Army Donald  MR number: [id number]  Account number: [de-identified]  : 1965  Age: 61 years  Gender: Male  Status: Inpatient  Location: Intensive Care Unit (ICU)  Height: 73 in  Weight: 224 4 lb  BP: 113/ 78 mmHg    Indications: pulmonary embolism  Diagnoses: I27 9 - Pulmonary heart disease, unspecified    Sonographer:  Bertha Branch RDCS  Referring Physician:  Deion Meyers MD  Group:  [facility]  Interpreting Physician:  Claudell Garland, MD    SUMMARY    LEFT VENTRICLE:  The left ventricle was not well seen but appeared to have normal systolic function  Left ventricular size was normal  Left ventricular wall thickness was normal  No regional wall motion abnormalities were noted but could not be ruled out  RIGHT VENTRICLE:  The right ventricle was significantly enlarged and hypokinetic  The right ventricular free wall appeared near akinetic  There was better contractility at the apex consistent with Alvares's sign but not completely classical since apical  contractility was also reduced  RIGHT ATRIUM:  Right atrial enlargement  MITRAL VALVE:  Mild mitral regurgitation  AORTIC VALVE:  No aortic stenosis or regurgitation  TRICUSPID VALVE:  There was moderate regurgitation  PULMONIC VALVE:  There was trace regurgitation      PULMONARY ARTERIES:  Severe pulmonary hypertension  Pulmonary artery systolic pressure is estimated 78 mmHg  SUMMARY MEASUREMENTS  CW measurements:  Unspecified Anatomy:   TR Vmax was 4 m/s  TR maxPG was 62 9 mmHg  PW measurements:  Unspecified Anatomy:   E' Avg was 0 1 m/s  E' Lat was 0 1 m/s  E' Sept was 0 1 m/s  E/E' Avg was 6 1   E/E' Lat was 5 6   E/E' Sept was 6 7   MV E/A Ratio was 0 7   MV PHT was 90 8 ms  MVA By PHT was 2 4 cm2  PV Acc [place] was 13 6  m/s2  PV AccT was 48 5 ms   TV E' lat was 0 1 m/s  HISTORY: PRIOR HISTORY: bilateral pulmonary emboli, elevated troponin level, cardiac ischemia, acute kidney injury, hyperglycemia  PROCEDURE: The study was performed in the Intensive Care Unit (ICU)  This was a routine study  The transthoracic approach was used  The study included complete 2D imaging, M-mode, complete spectral Doppler, and color Doppler  The heart  rate was 87 bpm, at the start of the study  Intravenous contrast ( 0 4ml Definity in nss) was administered  Echocardiographic views were limited due to poor acoustic window availability, decreased penetration, and lung interference  This  was a technically difficult study  LEFT VENTRICLE: The left ventricle was not well seen but appeared to have normal systolic function  Left ventricular size was normal  Left ventricular wall thickness was normal  No regional wall motion abnormalities were noted but could  not be ruled out  RIGHT VENTRICLE: The right ventricle was significantly enlarged and hypokinetic  The right ventricular free wall appeared near akinetic  There was better contractility at the apex consistent with Alvares's sign but not completely  classical since apical contractility was also reduced  LEFT ATRIUM: Size was normal     RIGHT ATRIUM: Right atrial enlargement  MITRAL VALVE: Mild mitral regurgitation  AORTIC VALVE: No aortic stenosis or regurgitation      TRICUSPID VALVE: The valve structure was normal  There was normal leaflet separation  DOPPLER: The transtricuspid velocity was within the normal range  There was no evidence for stenosis  There was moderate regurgitation  PULMONIC VALVE: DOPPLER: The transpulmonic velocity was within the normal range  There was trace regurgitation  PERICARDIUM: There was no pericardial effusion  The pericardium was normal in appearance  AORTA: The root exhibited normal size  PULMONARY ARTERY: Severe pulmonary hypertension  Pulmonary artery systolic pressure is estimated 78 mmHg  SYSTEM MEASUREMENT TABLES    2D  %FS: 25 3 %  Ao Diam: 3 5 cm  EDV(Teich): 97 7 ml  EF(Teich): 49 9 %  ESV(Teich): 48 9 ml  IVSd: 1 cm  LA Diam: 2 7 cm  LVIDd: 4 6 cm  LVIDs: 3 4 cm  LVPWd: 1 cm  SV(Teich): 48 8 ml    CW  TR Vmax: 4 m/s  TR maxP 9 mmHg    MM  TAPSE: 1 8 cm    PW  E' Av 1 m/s  E' Lat: 0 1 m/s  E' Sept: 0 1 m/s  E/E' Av 1  E/E' Lat: 5 6  E/E' Sept: 6 7  MV A [place]: 0 6 m/s  MV Dec [place]: 1 2 m/s2  MV DecT: 313 1 ms  MV E [place]: 0 4 m/s  MV E/A Ratio: 0 7  MV PHT: 90 8 ms  MVA By PHT: 2 4 cm2  PV Acc [place]: 13 6 m/s2  PV AccT: 48 5 ms  TV E' lat: 0 1 m/s    [facility]    Prepared and electronically signed by    Ainsley Martinez MD  Signed 2021 16:49:26       No results found for this or any previous visit  This note was completed in part utilizing [employer] direct voice recognition software  Grammatical errors, random word insertion, spelling mistakes, and incomplete sentences may be an occasional consequence of the system secondary to software limitations, ambient noise and hardware issues  At the time of dictation, efforts were made to edit, clarify and /or correct errors  Please read the chart carefully and recognize, using context, where substitutions have occurred    If you have any questions or concerns about the context, text or information contained within the body of this dictation, please contact myself, the provider, for further clarification

## 2021-02-15 ENCOUNTER — OFFICE VISIT (OUTPATIENT)
Dept: CARDIOLOGY CLINIC | Facility: CLINIC | Age: 56
End: 2021-02-15
Payer: COMMERCIAL

## 2021-02-15 ENCOUNTER — CONSULT (OUTPATIENT)
Dept: PULMONOLOGY | Facility: CLINIC | Age: 56
End: 2021-02-15
Payer: COMMERCIAL

## 2021-02-15 VITALS
WEIGHT: 219.5 LBS | OXYGEN SATURATION: 98 % | BODY MASS INDEX: 29.09 KG/M2 | DIASTOLIC BLOOD PRESSURE: 78 MMHG | TEMPERATURE: 98.8 F | SYSTOLIC BLOOD PRESSURE: 114 MMHG | HEIGHT: 73 IN | HEART RATE: 69 BPM

## 2021-02-15 VITALS
BODY MASS INDEX: 29.13 KG/M2 | HEART RATE: 81 BPM | OXYGEN SATURATION: 98 % | SYSTOLIC BLOOD PRESSURE: 110 MMHG | HEIGHT: 73 IN | WEIGHT: 219.8 LBS | DIASTOLIC BLOOD PRESSURE: 70 MMHG

## 2021-02-15 DIAGNOSIS — I25.9 CARDIAC ISCHEMIA: ICD-10-CM

## 2021-02-15 DIAGNOSIS — Z09 HOSPITAL DISCHARGE FOLLOW-UP: Primary | ICD-10-CM

## 2021-02-15 DIAGNOSIS — I26.99 BILATERAL PULMONARY EMBOLISM (HCC): ICD-10-CM

## 2021-02-15 DIAGNOSIS — I51.7 RIGHT VENTRICULAR DILATION: ICD-10-CM

## 2021-02-15 DIAGNOSIS — I26.99 BILATERAL PULMONARY EMBOLISM (HCC): Primary | ICD-10-CM

## 2021-02-15 DIAGNOSIS — R73.03 PRE-DIABETES: ICD-10-CM

## 2021-02-15 PROCEDURE — 99204 OFFICE O/P NEW MOD 45 MIN: CPT | Performed by: NURSE PRACTITIONER

## 2021-02-15 PROCEDURE — 93000 ELECTROCARDIOGRAM COMPLETE: CPT | Performed by: NURSE PRACTITIONER

## 2021-02-15 PROCEDURE — 99214 OFFICE O/P EST MOD 30 MIN: CPT | Performed by: INTERNAL MEDICINE

## 2021-02-15 NOTE — ASSESSMENT & PLAN NOTE
Patient seems hemodynamically and gas exchange wise stable from pulmonary embolism suffered on January 28th or 29th  Etiology for this remains elusive  Plan check COVID-19 antibody in about a month  If abnormal would not clearly the test for other thrombophilia  Otherwise thrombophilia evaluation seems indicated based on severity of this condition  I agree the patient should had have routine colonoscopy for colon cancer screening based on his age and testicular imaging based on his family history of testicular cancer  No clinical evidence of pulmonary hypertension today  30 minutes visit with discussion and counseling

## 2021-02-15 NOTE — PROGRESS NOTES
Assessment/Plan:    Bilateral pulmonary embolism [facility])  Patient seems hemodynamically and gas exchange wise stable from pulmonary embolism suffered on January 28th or 29th  Etiology for this remains elusive  Plan check COVID-19 antibody in about a month  If abnormal would not clearly the test for other thrombophilia  Otherwise the sense of thrombophilia evaluation seems indicated based on severity of this condition  I agree the patient has had have routine colonoscopy for colon cancer screening based on his age and testicular imaging based on his family history of testicular cancer  No clinical evidence of pulmonary hypertension today  30 minutes visit with discussion and counseling  Diagnoses and all orders for this visit:    Bilateral pulmonary embolism (HCC)  -     SARS-CoV-2 Serology (COVID-19) Antibodies (IgG, IgM), Immunoassay; Future          Subjective:      Patient ID: Mike Graham is a 61 y o  male  This patient hospitalized on January 29th for acute large volume pulmonary embolus  He actually became symptomatic on the day before  Has had chest pains and the severe dyspnea on exertion  Never had syncope nor hemoptysis  Never had pleurisy  Eventually stabilized and was not not felt indicated to use either thrombolytics or catheter based treatments  Now feels fairly well without dyspnea on exertion and normal activities feasible  No chest pain  No family history of thrombosis  No other underlying condition  Did have COVID-19 testing which was negative on admission  Never really symptomatic with anything that sounds like COVID-19  The following portions of the patient's history were reviewed and updated as appropriate: allergies, current medications, past family history, past medical history, past social history, past surgical history and problem list     Review of Systems   Constitutional: Negative for activity change     Respiratory: Negative for cough, shortness of breath and wheezing  Cardiovascular: Negative for chest pain, palpitations and leg swelling  Hematological: Does not bruise/bleed easily  Objective:      /78 (BP Location: Right arm, Patient Position: Sitting, Cuff Size: Large)   Pulse 69   Temp 98 8 °F (37 1 °C) (Tympanic)   Ht 6' 1" (1 854 m)   Wt 99 6 kg (219 lb 8 oz)   SpO2 98%   BMI 28 96 kg/m²          Physical Exam  Vitals signs reviewed  Constitutional:       Appearance: He is normal weight  He is not ill-appearing  Neck:      Musculoskeletal: No neck rigidity or muscular tenderness  Cardiovascular:      Rate and Rhythm: Normal rate and regular rhythm  Pulses:           Radial pulses are 2+ on the right side and 2+ on the left side  Heart sounds: Normal heart sounds  Pulmonary:      Effort: Pulmonary effort is normal       Breath sounds: Normal breath sounds  Musculoskeletal:         General: No swelling  Right lower leg: No edema  Left lower leg: No edema  Lymphadenopathy:      Cervical: No cervical adenopathy  Skin:     General: Skin is warm and dry  Neurological:      Mental Status: He is alert and oriented to person, place, and time     Psychiatric:         Mood and Affect: Mood normal          Behavior: Behavior normal

## 2021-02-15 NOTE — LETTER
February 15, 2021     MD Karen ValleFormerly [facility] [phone] [id number]    Patient: Cha Saldivar   YOB: 1965   Date of Visit: 2/15/2021       Dear Dr Destin Cortez: Thank you for referring Genoveva Martinez to me for evaluation  Below are my notes for this consultation  If you have questions, please do not hesitate to call me  I look forward to following your patient along with you  Sincerely,        JULISA Alvarado        CC: MD Gloria Lomas CRNP  2/15/2021 10:07 AM  Sign when Signing Visit  General cardiology   Consult  Cha Saldivar   61 y o    male   MRN: [id number]  [address]  [address]  [address] [place] [phone] [phone]    PCP: Yadira Acosta MD  [facility]     referred by PCP   indication: Right heart strain              Summary of recommendations  Heart healthy diet  Educational information provided  lipid profile pending  Repeat echocardiogram 3 months  Follow up will be scheduled with Dr Chana Steve 3 months        Assessment/plan  Bilateral pulmonary emboli On Eliquis 5 mg b i d  workup per PCP  PSA, testicular ultrasound, thrombosis panel, referral to GI for colonoscopy, CT abdomen pelvis, lipid profile, all pending per PCP  He has an appointment with pulmonology today  non MI troponin elevation, to 0 32 secondary to bilateral PE  --EKG toay:  Sinus rhythm 75 beats per minute  PVCs  Nonspecific ST T-wave changes inferiorly and anteriorly  right heart strain secondary to bilateral pulmonary emboli- will repeat an echocardiogram in 3 months  severe pulmonary hypertension   abnormal EKG, likely due to demand ischemia from  extensive PE  Pre diabetes, HgA1c 6 3  He has made significant lifestyle changes  In his diet, and has lost weight as a result  CV screening  · Lipid profile- pending  Cardiac testing  --TTE 1/29/21  Left ventricle  Not well seen    Prior to have normal systolic function, LV size was normal   Wall thickness was normal   No  Obvious her regional motion abnormalities  The RV was significantly enlarged and hypokinetic  The RV free wall appeared near akinetic  Right atrial enlargement  Mild MR  Severe pulmonary hypertension, PASP estimated 78 mm Hg            HPI  Destinee Shaw is a 60 yo male  Who is a lifelong nonsmoker  He has no chronic known medical problems    Adm -21with a dry cough and SOB; COVID-19 neg    Workup disclosed an elevated D-dimer  CTA of the chest showed extensive,  diffuse, bilateral pulmonary emboli, unprovoked  An echocardiogram showed right heart strain and severe pulmonary hypertension  He did have a troponin elevation to 0 32, felt to be related to his acute pulmonary emboli  NT proBNP elevated at 7113  He was placed on anticoagulation with Eliquis 5 mg b i d  He was also found have an elevated hemoglobin A1c of 6 3, consistent with pre diabetes  He has followed up with internal medicine after discharge  A CT of his chest and abdomen, testicular ultrasound and colonoscopy have been ordered, as well as a GI referral    FH Ma- colon cancer  She had a reported silent MI in her late 62s and  in her late 62s   Fa-testicular cancer    in his late 62s  He had a brother who  in his mid 62s from an unknown cause  SH lifelong nonsmoker  Rare alcohol  No illicit drugs  2/15/21    Cardiology consult, referred by his PCP for right heart strain  He continues on anticoagulation for his bilateral PE  He has modified his diet and made improvements, reducing his carbohydrates and sugars  He has lost about 10 lb  He denies chest pain or shortness of breath  He is a printer , 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  He can walk anywhere from 4-5 miles a day he tells me  He was able to do this around [place] time without any chest pain or shortness of breath    Currently, his shortness of breath is improving , although he feels he is not "back to himself"  He has no lower extremity edema  He denies lightheadedness or dizziness  blood pressure 110/70   O2 sat 98%   EKG: Sinus rhythm with PACs  Diffuse nonspecific ST T-wave changes inferiorly and anteriorl    Wt Readings from Last 3 Encounters:   02/15/21 99 7 kg (219 lb 12 8 oz)   02/09/21 103 kg (228 lb)   01/31/21 102 kg (224 lb 10 4 oz)      Case discussed briefly with Dr Trish Singer in the office  Will repeat an echocardiogram in 3 months  Ongoing cardiology follow-up recommended  Workup in progress for his bilateral PE, per his PCP      Assessment  Diagnoses and all orders for this visit:    Hospital discharge follow-up    Bilateral pulmonary embolism ([address] )  -     Echo complete with contrast if indicated; Future    Pre-diabetes    Cardiac ischemia  -     POCT ECG    Right ventricular dilation  -     Echo complete with contrast if indicated; Future          Past Medical History:   Diagnosis Date    Allergic        Review of Systems   Constitution: Positive for weight loss  Negative for chills  Cardiovascular: Positive for dyspnea on exertion  Negative for chest pain, claudication, cyanosis, irregular heartbeat, leg swelling, near-syncope, orthopnea, palpitations, paroxysmal nocturnal dyspnea and syncope  Respiratory: Negative for cough and shortness of breath  Gastrointestinal: Negative for heartburn and nausea  Neurological: Negative for dizziness, focal weakness, headaches, light-headedness and weakness  All other systems reviewed and are negative  No Known Allergies        Current Outpatient Medications:     [START ON 2/28/2021] apixaban (ELIQUIS) 5 mg, Take 1 tablet (5 mg total) by mouth 2 (two) times a day, Disp: 180 tablet, Rfl: 1    Ascorbic Acid (VITAMIN C PO), Take 250 mg by mouth daily , Disp: , Rfl:     VITAMIN D PO, Take 1,000 Units by mouth daily , Disp: , Rfl:     apixaban (ELIQUIS) 5 mg, Take 2 tablets (10 mg total) by mouth 2 (two) times a day for 7 days, THEN 1 tablet (5 mg total) 2 (two) times a day for 23 days  (Patient not taking: Reported on 2/15/2021), Disp: 74 tablet, Rfl: 0        Social History     Socioeconomic History    Marital status: /Civil Union     Spouse name: Not on file    Number of children: Not on file    Years of education: Not on file    Highest education level: Not on file   Occupational History    Not on file   Social Needs    Financial resource strain: Not hard at all   [employer] insecurity     Worry: Never true     Inability: Never true   [email or web address] needs     Medical: No     Non-medical: No   Tobacco Use    Smoking status: Never Smoker    Smokeless tobacco: Never Used   Substance and Sexual Activity    Alcohol use: Yes     Frequency: Monthly or less     Drinks per session: 1 or 2     Comment: occasionally    Drug use: Never    Sexual activity: Yes   Lifestyle    Physical activity     Days per week: 0 days     Minutes per session: Not on file    Stress: Only a little   Relationships    Social connections     Talks on phone: Three times a week     Gets together: Twice a week     Attends Rastafarian service: Never     Active member of club or organization: No     Attends meetings of clubs or organizations: Never     Relationship status:     Intimate partner violence     Fear of current or ex partner: No     Emotionally abused: No     Physically abused: No     Forced sexual activity: No   Other Topics Concern    Not on file   Social History Narrative    Not on file       Family History   Problem Relation Age of Onset    Heart disease Mother     Cancer Father        Physical Exam   Constitutional: He is oriented to person, place, and time  No distress  HENT:   Head: Normocephalic and atraumatic  Eyes: Conjunctivae and EOM are normal    Neck: Normal range of motion  Neck supple     Cardiovascular: Normal rate, regular rhythm, normal heart sounds and intact distal pulses  Pulmonary/Chest: Effort normal  He has decreased breath sounds in the left lower field  Abdominal: Soft  Bowel sounds are normal    Musculoskeletal: Normal range of motion  Neurological: He is alert and oriented to person, place, and time  Skin: Skin is warm and dry  Psychiatric: He has a normal mood and affect  Nursing note and vitals reviewed  Vitals: Blood pressure 110/70, pulse 81, height 6' 1" (1 854 m), weight 99 7 kg (219 lb 12 8 oz), SpO2 98 %  Wt Readings from Last 3 Encounters:   02/15/21 99 7 kg (219 lb 12 8 oz)   02/09/21 103 kg (228 lb)   01/31/21 102 kg (224 lb 10 4 oz)         Labs & Results:  Lab Results   Component Value Date    WBC 13 03 (H) 01/30/2021    HGB 13 4 01/30/2021    HCT 42 0 01/30/2021    MCV 90 01/30/2021     01/30/2021     No results found for: BNP  No components found for: CHEM  Troponin I   Date Value Ref Range Status   01/29/2021 0 17 (H) <=0 04 ng/mL Final     Comment:     [employer] Chemistry analyzer 99% cutoff is > 0 04 ng/mL in network labs     o cTnI 99% cutoff is useful only when applied to patients in the clinical setting of myocardial ischemia   o cTnI 99% cutoff should be interpreted in the context of clinical history, ECG findings and possibly cardiac imaging to establish correct diagnosis  o cTnI 99% cutoff may be suggestive but clearly not indicative of a coronary event without the clinical setting of myocardial ischemia  Results indicate test should be repeated on new specimen collected within 4-6 hours of the original   01/29/2021 0 26 (H) <=0 04 ng/mL Final     Comment:     S[employer]ens Chemistry analyzer 99% cutoff is > 0 04 ng/mL in network labs     o cTnI 99% cutoff is useful only when applied to patients in the clinical setting of myocardial ischemia   o cTnI 99% cutoff should be interpreted in the context of clinical history, ECG findings and possibly cardiac imaging to establish correct diagnosis     o cTnI 99% cutoff may be suggestive but clearly not indicative of a coronary event without the clinical setting of myocardial ischemia  Results indicate test should be repeated on new specimen collected within 4-6 hours of the original   2021 0 32 (H) <=0 04 ng/mL Final     Comment:     S[employer]ens Chemistry analyzer 99% cutoff is > 0 04 ng/mL in network labs     o cTnI 99% cutoff is useful only when applied to patients in the clinical setting of myocardial ischemia   o cTnI 99% cutoff should be interpreted in the context of clinical history, ECG findings and possibly cardiac imaging to establish correct diagnosis  o cTnI 99% cutoff may be suggestive but clearly not indicative of a coronary event without the clinical setting of myocardial ischemia  Results indicate test should be repeated on new specimen collected within 4-6 hours of the original     Results for orders placed during the hospital encounter of 21   Echo complete with contrast if indicated    Narrative [address]  [address]  [place], [address]  [phone]    Transthoracic Echocardiogram  2D, M-mode, Doppler, and Color Doppler    Study date:  2021    Patient: Josh Queen  MR number: [id number]  Account number: [de-identified]  : 1965  Age: 61 years  Gender: Male  Status: Inpatient  Location: Intensive Care Unit (ICU)  Height: 73 in  Weight: 224 4 lb  BP: 113/ 78 mmHg    Indications: pulmonary embolism  Diagnoses: I27 9 - Pulmonary heart disease, unspecified    Sonographer:  Ale Russo RDCS  Referring Physician:  Sallye Olszewski, MD  Group:  [facility]  Interpreting Physician:  Omkar Mistry MD    SUMMARY    LEFT VENTRICLE:  The left ventricle was not well seen but appeared to have normal systolic function   Left ventricular size was normal  Left ventricular wall thickness was normal  No regional wall motion abnormalities were noted but could not be ruled out     RIGHT VENTRICLE:  The right ventricle was significantly enlarged and hypokinetic  The right ventricular free wall appeared near akinetic  There was better contractility at the apex consistent with Alvares's sign but not completely classical since apical  contractility was also reduced  RIGHT ATRIUM:  Right atrial enlargement  MITRAL VALVE:  Mild mitral regurgitation  AORTIC VALVE:  No aortic stenosis or regurgitation  TRICUSPID VALVE:  There was moderate regurgitation  PULMONIC VALVE:  There was trace regurgitation  PULMONARY ARTERIES:  Severe pulmonary hypertension  Pulmonary artery systolic pressure is estimated 78 mmHg  SUMMARY MEASUREMENTS  CW measurements:  Unspecified Anatomy:   TR Vmax was 4 m/s  TR maxPG was 62 9 mmHg  PW measurements:  Unspecified Anatomy:   E' Avg was 0 1 m/s  E' Lat was 0 1 m/s  E' Sept was 0 1 m/s  E/E' Avg was 6 1   E/E' Lat was 5 6   E/E' Sept was 6 7   MV E/A Ratio was 0 7   MV PHT was 90 8 ms  MVA By PHT was 2 4 cm2  PV Acc [place] was 13 6  m/s2  PV AccT was 48 5 ms   TV E' lat was 0 1 m/s  HISTORY: PRIOR HISTORY: bilateral pulmonary emboli, elevated troponin level, cardiac ischemia, acute kidney injury, hyperglycemia  PROCEDURE: The study was performed in the Intensive Care Unit (ICU)  This was a routine study  The transthoracic approach was used  The study included complete 2D imaging, M-mode, complete spectral Doppler, and color Doppler  The heart  rate was 87 bpm, at the start of the study  Intravenous contrast ( 0 4ml Definity in nss) was administered  Echocardiographic views were limited due to poor acoustic window availability, decreased penetration, and lung interference  This  was a technically difficult study  LEFT VENTRICLE: The left ventricle was not well seen but appeared to have normal systolic function   Left ventricular size was normal  Left ventricular wall thickness was normal  No regional wall motion abnormalities were noted but could  not be ruled out  RIGHT VENTRICLE: The right ventricle was significantly enlarged and hypokinetic  The right ventricular free wall appeared near akinetic  There was better contractility at the apex consistent with Alvares's sign but not completely  classical since apical contractility was also reduced  LEFT ATRIUM: Size was normal     RIGHT ATRIUM: Right atrial enlargement  MITRAL VALVE: Mild mitral regurgitation  AORTIC VALVE: No aortic stenosis or regurgitation  TRICUSPID VALVE: The valve structure was normal  There was normal leaflet separation  DOPPLER: The transtricuspid velocity was within the normal range  There was no evidence for stenosis  There was moderate regurgitation  PULMONIC VALVE: DOPPLER: The transpulmonic velocity was within the normal range  There was trace regurgitation  PERICARDIUM: There was no pericardial effusion  The pericardium was normal in appearance  AORTA: The root exhibited normal size  PULMONARY ARTERY: Severe pulmonary hypertension  Pulmonary artery systolic pressure is estimated 78 mmHg  SYSTEM MEASUREMENT TABLES    2D  %FS: 25 3 %  Ao Diam: 3 5 cm  EDV(Teich): 97 7 ml  EF(Teich): 49 9 %  ESV(Teich): 48 9 ml  IVSd: 1 cm  LA Diam: 2 7 cm  LVIDd: 4 6 cm  LVIDs: 3 4 cm  LVPWd: 1 cm  SV(Teich): 48 8 ml    CW  TR Vmax: 4 m/s  TR maxP 9 mmHg    MM  TAPSE: 1 8 cm    PW  E' Av 1 m/s  E' Lat: 0 1 m/s  E' Sept: 0 1 m/s  E/E' Av 1  E/E' Lat: 5 6  E/E' Sept: 6 7  MV A [place]: 0 6 m/s  MV Dec [place]: 1 2 m/s2  MV DecT: 313 1 ms  MV E [place]: 0 4 m/s  MV E/A Ratio: 0 7  MV PHT: 90 8 ms  MVA By PHT: 2 4 cm2  PV Acc [place]: 13 6 m/s2  PV AccT: 48 5 ms  TV E' lat: 0 1 m/s    [employer] Accredited Echocardiography Laboratory    Prepared and electronically signed by    Jennifer Campos MD  Signed 2021 16:49:26       No results found for this or any previous visit      This note was completed in part utilizing m-modal fluency direct voice recognition software  Grammatical errors, random word insertion, spelling mistakes, and incomplete sentences may be an occasional consequence of the system secondary to software limitations, ambient noise and hardware issues  At the time of dictation, efforts were made to edit, clarify and /or correct errors  Please read the chart carefully and recognize, using context, where substitutions have occurred    If you have any questions or concerns about the context, text or information contained within the body of this dictation, please contact myself, the provider, for further clarification

## 2021-02-23 ENCOUNTER — HOSPITAL ENCOUNTER (OUTPATIENT)
Dept: ULTRASOUND IMAGING | Facility: HOSPITAL | Age: 56
Discharge: HOME | End: 2021-02-23
Payer: COMMERCIAL

## 2021-02-23 ENCOUNTER — HOSPITAL ENCOUNTER (OUTPATIENT)
Dept: CT IMAGING | Facility: HOSPITAL | Age: 56
Discharge: HOME | End: 2021-02-23
Payer: COMMERCIAL

## 2021-02-23 DIAGNOSIS — Z12.9 CANCER SCREENING: ICD-10-CM

## 2021-02-23 PROCEDURE — G1004 CDSM NDSC: HCPCS

## 2021-02-23 PROCEDURE — 76870 US EXAM SCROTUM: CPT

## 2021-02-23 PROCEDURE — 74177 CT ABD & PELVIS W/CONTRAST: CPT

## 2021-02-23 RX ADMIN — IOHEXOL 100 ML: 350 INJECTION, SOLUTION INTRAVENOUS at 09:17

## 2021-03-01 ENCOUNTER — TELEPHONE (OUTPATIENT)
Dept: INTERNAL MEDICINE CLINIC | Facility: CLINIC | Age: 56
End: 2021-03-01

## 2021-03-01 NOTE — TELEPHONE ENCOUNTER
Patient called and said that he is still having sx from his PE  Feeling very tired and run down  No shortness of breath  He wanted to see if he can extend his time out of work due to him not feeling up to it

## 2021-03-01 NOTE — TELEPHONE ENCOUNTER
I've sent a note to patient in [facility] [address]  Let me know if anything else is needed  Thank you!

## 2021-03-01 NOTE — LETTER
March 1, 2021     Patient: Anjum Wilkerson   YOB: 1965   Date of Visit: 02/09/2021       To Whom It May Concern: It is my medical opinion that Martin Yanez could potentially return to work on March 8th, 2021 with potential for extension of this timeline  If you have any questions or concerns, please don't hesitate to call           Sincerely,      Juli Lyles MD    CC: No Recipients

## 2021-03-02 ENCOUNTER — TELEPHONE (OUTPATIENT)
Dept: INTERNAL MEDICINE CLINIC | Facility: CLINIC | Age: 56
End: 2021-03-02

## 2021-03-08 NOTE — TELEPHONE ENCOUNTER
Wife had sent over paperwork to be completed for his job  If you can please complete this with the plan of what he may need and verify with the patient when he will return to work or what accommodation he may need  Thank you Paperwork in your bin at nurses station

## 2021-03-08 NOTE — TELEPHONE ENCOUNTER
Thank you! Patient has an appointment with Dr King Vieira this Thursday 03/11/2021  I have ccd Dr King Veiira to make sure he is aware as I will be at a different office on Thursday

## 2021-03-09 ENCOUNTER — OFFICE VISIT (OUTPATIENT)
Dept: GASTROENTEROLOGY | Facility: AMBULARY SURGERY CENTER | Age: 56
End: 2021-03-09
Payer: COMMERCIAL

## 2021-03-09 VITALS
DIASTOLIC BLOOD PRESSURE: 84 MMHG | SYSTOLIC BLOOD PRESSURE: 134 MMHG | BODY MASS INDEX: 28.76 KG/M2 | HEIGHT: 73 IN | WEIGHT: 217 LBS

## 2021-03-09 DIAGNOSIS — R10.13 DYSPEPSIA: ICD-10-CM

## 2021-03-09 DIAGNOSIS — Z12.9 CANCER SCREENING: ICD-10-CM

## 2021-03-09 DIAGNOSIS — Z79.01 ANTICOAGULANT LONG-TERM USE: ICD-10-CM

## 2021-03-09 DIAGNOSIS — Z12.11 SCREEN FOR COLON CANCER: Primary | ICD-10-CM

## 2021-03-09 PROCEDURE — 99204 OFFICE O/P NEW MOD 45 MIN: CPT | Performed by: INTERNAL MEDICINE

## 2021-03-09 RX ORDER — SODIUM, POTASSIUM,MAG SULFATES 17.5-3.13G
2 SOLUTION, RECONSTITUTED, ORAL ORAL SEE ADMIN INSTRUCTIONS
Qty: 2 BOTTLE | Refills: 0 | Status: SHIPPED | OUTPATIENT
Start: 2021-03-09

## 2021-03-09 NOTE — ASSESSMENT & PLAN NOTE
Nonspecific symptoms appear to be secondary to Eliquis  Rule out upper GI malignancy also because of recent pulmonary embolism    CT scan was negative    -  Schedule for EGD

## 2021-03-09 NOTE — PROGRESS NOTES
Consultation - [facility]  Gurjit Rowan 1965 male         Chief Complaint:  For colonoscopy    HPI:   70-year-old male with history of recently diagnosed pulmonary embolism on anticoagulation therapy with Eliquis was referred for colonoscopy  Patient never had colonoscopy in the past   He reports having discomfort and feeling of indigestion in the stomach because of Eliquis  Denies any heartburn acid reflux  Denies any difficulty swallowing  Good appetite, no recent weight loss  Regular bowel movements and denies any blood or mucus in the stool  No abdominal pain, nausea or vomiting  REVIEW OF SYSTEMS: Review of Systems   Constitutional: Negative for activity change, appetite change, chills, diaphoresis, fatigue, fever and unexpected weight change  HENT: Negative for ear discharge, ear pain, facial swelling, hearing loss, nosebleeds, sore throat, tinnitus and voice change  Eyes: Negative for pain, discharge, redness, itching and visual disturbance  Respiratory: Negative for apnea, cough, chest tightness, shortness of breath and wheezing  Cardiovascular: Negative for chest pain and palpitations  Gastrointestinal:        As noted in HPI   Endocrine: Negative for cold intolerance, heat intolerance and polyuria  Genitourinary: Negative for difficulty urinating, dysuria, flank pain, hematuria and urgency  Musculoskeletal: Negative for arthralgias, back pain, gait problem, joint swelling and myalgias  Skin: Negative for rash and wound  Neurological: Negative for dizziness, tremors, seizures, speech difficulty, light-headedness, numbness and headaches  Hematological: Negative for adenopathy  Does not bruise/bleed easily  Psychiatric/Behavioral: Negative for agitation, behavioral problems and confusion  The patient is not nervous/anxious           Past Medical History:   Diagnosis Date    Allergic     Diverticulosis       Past Surgical History:   Procedure Laterality Date    VASECTOMY       Social History     Socioeconomic History    Marital status: /Civil Union     Spouse name: Not on file    Number of children: Not on file    Years of education: Not on file    Highest education level: Not on file   Occupational History    Not on file   Social Needs    Financial resource strain: Not hard at all   [place] insecurity     Worry: Never true     Inability: Never true   [employer] needs     Medical: No     Non-medical: No   Tobacco Use    Smoking status: Never Smoker    Smokeless tobacco: Never Used   Substance and Sexual Activity    Alcohol use: Yes     Frequency: Monthly or less     Drinks per session: 1 or 2     Comment: occasionally    Drug use: Never    Sexual activity: Yes   Lifestyle    Physical activity     Days per week: 0 days     Minutes per session: Not on file    Stress: Only a little   Relationships    Social connections     Talks on phone: Three times a week     Gets together: Twice a week     Attends [employer] service: Never     Active member of club or organization: No     Attends meetings of clubs or organizations: Never     Relationship status:     Intimate partner violence     Fear of current or ex partner: No     Emotionally abused: No     Physically abused: No     Forced sexual activity: No   Other Topics Concern    Not on file   Social History Narrative    Not on file     Family History   Problem Relation Age of Onset    Heart disease Mother     Cancer Mother     Testicular cancer Father     Sleep apnea Brother      Patient has no known allergies    Current Outpatient Medications   Medication Sig Dispense Refill    apixaban (ELIQUIS) 5 mg Take 1 tablet (5 mg total) by mouth 2 (two) times a day 180 tablet 1    Ascorbic Acid (VITAMIN C PO) Take 250 mg by mouth daily       psyllium (METAMUCIL) 58 6 % powder Take 1 packet by mouth 3 (three) times a day      VITAMIN D PO Take 1,000 Units by mouth daily       apixaban (ELIQUIS) 5 mg Take 2 tablets (10 mg total) by mouth 2 (two) times a day for 7 days, THEN 1 tablet (5 mg total) 2 (two) times a day for 23 days  (Patient not taking: Reported on 2/15/2021) 74 tablet 0    Na Sulfate-K Sulfate-Mg Sulf (Suprep Bowel Prep Kit) 17 5-3 13-1 6 GM/177ML SOLN Take 2 Bottles by mouth see administration instructions Please follow the instructions from the office 2 Bottle 0     No current facility-administered medications for this visit  Blood pressure 134/84, height 6' 1" (1 854 m), weight 98 4 kg (217 lb)  PHYSICAL EXAM: Physical Exam  Constitutional:       Appearance: He is well-developed  HENT:      Head: Normocephalic and atraumatic  Eyes:      General: No scleral icterus  Right eye: No discharge  Left eye: No discharge  Conjunctiva/sclera: Conjunctivae normal       Pupils: Pupils are equal, round, and reactive to light  Neck:      Musculoskeletal: Neck supple  Thyroid: No thyromegaly  Vascular: No JVD  Trachea: No tracheal deviation  Cardiovascular:      Rate and Rhythm: Normal rate and regular rhythm  Heart sounds: Normal heart sounds  No murmur  No friction rub  No gallop  Pulmonary:      Effort: Pulmonary effort is normal  No respiratory distress  Breath sounds: Normal breath sounds  No wheezing or rales  Chest:      Chest wall: No tenderness  Abdominal:      General: Bowel sounds are normal  There is no distension  Palpations: Abdomen is soft  There is no mass  Tenderness: There is no abdominal tenderness  There is no guarding or rebound  Hernia: No hernia is present  Lymphadenopathy:      Cervical: No cervical adenopathy  Skin:     General: Skin is warm and dry  Findings: No erythema or rash  Neurological:      Mental Status: He is alert and oriented to person, place, and time  Psychiatric:         Behavior: Behavior normal          Thought Content:  Thought content normal  Lab Results   Component Value Date    WBC 13 03 (H) 01/30/2021    HGB 13 4 01/30/2021    HCT 42 0 01/30/2021    MCV 90 01/30/2021     01/30/2021     Lab Results   Component Value Date    CALCIUM 9 4 01/30/2021    K 3 9 01/30/2021    CO2 19 (L) 01/30/2021     (H) 01/30/2021    BUN 17 01/30/2021    CREATININE 1 01 01/30/2021     Lab Results   Component Value Date    ALT 14 01/28/2021    AST 16 01/28/2021    ALKPHOS 84 7 01/28/2021     Lab Results   Component Value Date    INR 1 35 (H) 01/29/2021    PROTIME 16 4 (H) 01/29/2021       Us Scrotum And Testicles    Result Date: 2/26/2021  Impression: 1  Scattered testicular microlithiasis is present without intratesticular mass or other worrisome findings  In the absence of any other risk factors for testicular cancer (e g , personal history of testicular cancer, father or brother with testicular cancer, history of cryptorchidism for maldescent, testicular atrophy, or other risk factors), no further imaging or biochemical follow-up is necessary; all that is recommended is routine monthly testicular self-examination  However if the patient has risk factors for testicular cancer, evaluation and determination of an optimal follow-up strategy is deferred to urologist  (Reference: AJR 2016: [id number] ) 2  Subcentimeter right epididymal cyst  Workstation performed: [id number]     Ct Abdomen Pelvis W Contrast    Result Date: 2/27/2021  Impression: 1  No CT evidence for malignancy, as queried  2   Sigmoid and descending colon diverticulosis  3   Mild prostatic enlargement  Workstation performed: [id number]       ASSESSMENT & PLAN:    Screen for colon cancer   Screening for colon cancer - patient is at average risk for colon cancer screening  Rule out colorectal lesions including polyps or malignancy especially with history of recently diagnosed PE         -Schedule for colonoscopy      -High-fiber diet     -Patient was given instructions about the colonoscopy prep     -Patient was explained about  the risks and benefits of the procedure  Risks including but not limited to bleeding, infection, perforation were explained in detail  Also explained about less than 100% sensitivity with the exam and other alternatives  Dyspepsia   Nonspecific symptoms appear to be secondary to Eliquis  Rule out upper GI malignancy also because of recent pulmonary embolism  CT scan was negative    -  Schedule for EGD    Anticoagulant long-term use   Patient is at increased risks because of  Anticoagulation therapy on Eliquis for recent pulmonary embolism  He has an appointment with his PCP this  Thursday  Advised him to check with his PCP about holding Eliquis for couple of days prior to the procedure and get the clearance  Patient would like to wait for another month and will give a call back to schedule

## 2021-03-09 NOTE — ASSESSMENT & PLAN NOTE
Patient is at increased risks because of  Anticoagulation therapy on Eliquis for recent pulmonary embolism  He has an appointment with his PCP this  Thursday  Advised him to check with his PCP about holding Eliquis for couple of days prior to the procedure and get the clearance  Patient would like to wait for another month and will give a call back to schedule

## 2021-03-09 NOTE — ASSESSMENT & PLAN NOTE
Screening for colon cancer - patient is at average risk for colon cancer screening  Rule out colorectal lesions including polyps or malignancy especially with history of recently diagnosed PE         -Schedule for colonoscopy  -High-fiber diet     -Patient was given instructions about the colonoscopy prep     -Patient was explained about  the risks and benefits of the procedure  Risks including but not limited to bleeding, infection, perforation were explained in detail  Also explained about less than 100% sensitivity with the exam and other alternatives

## 2021-03-11 ENCOUNTER — OFFICE VISIT (OUTPATIENT)
Dept: INTERNAL MEDICINE CLINIC | Facility: CLINIC | Age: 56
End: 2021-03-11
Payer: COMMERCIAL

## 2021-03-11 VITALS
OXYGEN SATURATION: 96 % | SYSTOLIC BLOOD PRESSURE: 136 MMHG | BODY MASS INDEX: 28.63 KG/M2 | HEART RATE: 83 BPM | WEIGHT: 217 LBS | DIASTOLIC BLOOD PRESSURE: 82 MMHG | TEMPERATURE: 98.8 F

## 2021-03-11 DIAGNOSIS — Z12.9 CANCER SCREENING: ICD-10-CM

## 2021-03-11 DIAGNOSIS — I26.94 MULTIPLE SUBSEGMENTAL PULMONARY EMBOLI WITHOUT ACUTE COR PULMONALE (HCC): ICD-10-CM

## 2021-03-11 DIAGNOSIS — R73.03 PREDIABETES: ICD-10-CM

## 2021-03-11 DIAGNOSIS — I26.99 BILATERAL PULMONARY EMBOLISM (HCC): Primary | ICD-10-CM

## 2021-03-11 PROCEDURE — 99918 SL PR STATE DISABILITY FORM: CPT | Performed by: INTERNAL MEDICINE

## 2021-03-11 PROCEDURE — 99214 OFFICE O/P EST MOD 30 MIN: CPT | Performed by: INTERNAL MEDICINE

## 2021-03-11 NOTE — ASSESSMENT & PLAN NOTE
·  CT abdomen/pelvis showed no masses, unremarkable except of colonic diverticulosis and mildly enlarged prostate  ·   Consider referral to urology given the patient family history of testicular cancer  ·  testicular ultrasound showed scattered testicular microlithiasis is present without intratesticular mass or other worrisome findings  ·  colonoscopy will be scheduled in the future, currently on hold given anticoagulation  ·  PSA level pending

## 2021-03-11 NOTE — ASSESSMENT & PLAN NOTE
·  Denies shortness of breath,chest pain, fever, chills ,lightheadedness or dizziness, on exam there is no clinical evidence hypertension  ·  recent evaluated by pulmonology on 2/15  ·  reporting side effects Eliquis( fine tremors, fogginess, abdominal discomfort, nausea) will switch to Xarelto   ·  patient was evaluated by GI for screening colonoscopy, will hold on the procedure until patient is at least 6 months on anticoagulation  ·  cancer screening workup: CT abdomen/pelvis was unremarkable except of colonic diverticulosis  ·  echocardiogram was ordered by Cardiology to further evaluate for right sided heart strain and pulmonary hypertension  ·   Thrombosis panel, PSA level still pending  ·   Continue monitor clinically, monitor for Xarelto side effects, patient was advised to contact the clinic with any concerns or questions

## 2021-03-11 NOTE — PROGRESS NOTES
Assessment/Plan:    Bilateral pulmonary embolism (HCC)  ·  Denies shortness of breath,chest pain, fever, chills ,lightheadedness or dizziness, on exam there is no clinical evidence hypertension  ·  recent evaluated by pulmonology on 2/15  ·  reporting side effects Eliquis( fine tremors, fogginess, abdominal discomfort, nausea) will switch to Xarelto   ·  patient was evaluated by GI for screening colonoscopy, will hold on the procedure until patient is at least 6 months on anticoagulation  ·  cancer screening workup: CT abdomen/pelvis was unremarkable except of colonic diverticulosis  ·  echocardiogram was ordered by Cardiology to further evaluate for right sided heart strain and pulmonary hypertension  ·   Thrombosis panel, PSA level still pending  ·   Continue monitor clinically, monitor for Xarelto side effects, patient was advised to contact the clinic with any concerns or questions  Pre-diabetes  ·  Will continue management  lifestyle modification( diet, exercise)  ·  repeated hemoglobin A1c currently pending  ·  based on the repeated hemoglobin A1c levels will further discuss if there is need to start medications  ·  lipid panel currently pending    Cancer screening  ·  CT abdomen/pelvis showed no masses, unremarkable except of colonic diverticulosis and mildly enlarged prostate  ·   Consider referral to urology given the patient family history of testicular cancer  ·  testicular ultrasound showed scattered testicular microlithiasis is present without intratesticular mass or other worrisome findings  ·  colonoscopy will be scheduled in the future, currently on hold given anticoagulation  ·  PSA level pending         Diagnoses and all orders for this visit:    Bilateral pulmonary embolism ([address] )    Cancer screening  -     Ambulatory referral to Urology; Future    Prediabetes    Multiple subsegmental pulmonary emboli without acute cor pulmonale (HCC)  -     rivaroxaban (XARELTO) 20 mg tablet;  Take 1 tablet (20 mg total) by mouth daily with breakfast          Subjective:      Patient ID: Cha Saldivar is a 61 y o  male  Who was previously seen after his hospital stay when he suffered bilateral pulmonary embolism, patient was started on Eliquis for anticoagulation, blood work during his up to stay indicated pre diabetes with hemoglobin A1c of 6 3, echocardiogram was suggestive for right-sided heart strain and severe pulmonary hypertension  Patient reports significant improvement of his breathing status was recently evaluated by Cardiology with repeated echocardiogram ordered on May  And pulmonology with no further recommendations from the side will continue monitor clinically  Patient was complaining of side effects to Eliquis he reports fogginess, abdominal discomfort, nausea and mild tremor and was requesting an alternative  The following portions of the patient's history were reviewed and updated as appropriate: allergies, current medications, past family history, past medical history, past social history, past surgical history and problem list     Review of Systems   Constitutional: Negative for activity change, appetite change, chills, fatigue and fever  HENT: Negative for congestion  Respiratory: Negative for choking, chest tightness, shortness of breath and wheezing  Cardiovascular: Negative for chest pain, palpitations and leg swelling  Gastrointestinal: Positive for nausea  Negative for abdominal pain and vomiting  Genitourinary: Negative for dysuria  Neurological: Negative for dizziness, syncope, light-headedness and headaches  Fogginess   Psychiatric/Behavioral: Positive for decreased concentration  The patient is not nervous/anxious            Objective:      /82 (BP Location: Left arm, Patient Position: Sitting, Cuff Size: Adult)   Pulse 83   Temp 98 8 °F (37 1 °C) (Tympanic)   Wt 98 4 kg (217 lb)   SpO2 96%   BMI 28 63 kg/m²          Physical Exam  Vitals signs and nursing note reviewed  Constitutional:       General: He is not in acute distress  Appearance: Normal appearance  He is not ill-appearing  HENT:      Head: Normocephalic and atraumatic  Cardiovascular:      Rate and Rhythm: Normal rate and regular rhythm  Heart sounds: Murmur present  No gallop  Pulmonary:      Effort: Pulmonary effort is normal  No respiratory distress  Breath sounds: Normal breath sounds  No wheezing or rales  Abdominal:      General: There is no distension  Palpations: Abdomen is soft  Tenderness: There is no abdominal tenderness  Musculoskeletal:         General: No swelling or tenderness  Skin:     Coloration: Skin is not jaundiced or pale  Neurological:      General: No focal deficit present  Mental Status: He is alert and oriented to person, place, and time  Comments:   Bilateral upper extremity tremors   Psychiatric:         Mood and Affect: Mood normal          Behavior: Behavior normal          Thought Content:  Thought content normal          Judgment: Judgment normal

## 2021-03-11 NOTE — ASSESSMENT & PLAN NOTE
·  Will continue management  lifestyle modification( diet, exercise)  ·  repeated hemoglobin A1c currently pending  ·  based on the repeated hemoglobin A1c levels will further discuss if there is need to start medications  ·  lipid panel currently pending

## 2021-03-13 ENCOUNTER — APPOINTMENT (OUTPATIENT)
Dept: LAB | Facility: HOSPITAL | Age: 56
End: 2021-03-13
Payer: COMMERCIAL

## 2021-03-13 DIAGNOSIS — Z12.9 CANCER SCREENING: ICD-10-CM

## 2021-03-13 DIAGNOSIS — I26.99 BILATERAL PULMONARY EMBOLISM (HCC): ICD-10-CM

## 2021-03-13 DIAGNOSIS — R73.03 PREDIABETES: ICD-10-CM

## 2021-03-13 LAB
CHOLEST SERPL-MCNC: 159 MG/DL
EST. AVERAGE GLUCOSE BLD GHB EST-MCNC: 126 MG/DL
HBA1C MFR BLD: 6 %
HDLC SERPL-MCNC: 52 MG/DL
LDLC SERPL CALC-MCNC: 77 MG/DL (ref 0–100)
NONHDLC SERPL-MCNC: 107 MG/DL
PSA SERPL-MCNC: 2.1 NG/ML (ref 0–4)
TRIGL SERPL-MCNC: 150.3 MG/DL

## 2021-03-13 PROCEDURE — 86147 CARDIOLIPIN ANTIBODY EA IG: CPT

## 2021-03-13 PROCEDURE — 85613 RUSSELL VIPER VENOM DILUTED: CPT

## 2021-03-13 PROCEDURE — 85306 CLOT INHIBIT PROT S FREE: CPT

## 2021-03-13 PROCEDURE — 80061 LIPID PANEL: CPT

## 2021-03-13 PROCEDURE — 85303 CLOT INHIBIT PROT C ACTIVITY: CPT

## 2021-03-13 PROCEDURE — 85305 CLOT INHIBIT PROT S TOTAL: CPT

## 2021-03-13 PROCEDURE — 86769 SARS-COV-2 COVID-19 ANTIBODY: CPT

## 2021-03-13 PROCEDURE — 86146 BETA-2 GLYCOPROTEIN ANTIBODY: CPT

## 2021-03-13 PROCEDURE — 85670 THROMBIN TIME PLASMA: CPT

## 2021-03-13 PROCEDURE — 85705 THROMBOPLASTIN INHIBITION: CPT

## 2021-03-13 PROCEDURE — 85300 ANTITHROMBIN III ACTIVITY: CPT

## 2021-03-13 PROCEDURE — 85732 THROMBOPLASTIN TIME PARTIAL: CPT

## 2021-03-13 PROCEDURE — 81240 F2 GENE: CPT

## 2021-03-13 PROCEDURE — 81241 F5 GENE: CPT

## 2021-03-13 PROCEDURE — 36415 COLL VENOUS BLD VENIPUNCTURE: CPT

## 2021-03-13 PROCEDURE — 83036 HEMOGLOBIN GLYCOSYLATED A1C: CPT

## 2021-03-13 PROCEDURE — G0103 PSA SCREENING: HCPCS

## 2021-03-14 LAB
DEPRECATED AT III PPP: 99 % OF NORMAL (ref 92–136)
SARS-COV-2 IGG SERPL QL IA: NEGATIVE

## 2021-03-15 LAB
PROT C AG ACT/NOR PPP IA: 147 % OF NORMAL (ref 60–150)
PROT S ACT/NOR PPP: 192 % (ref 57–157)
PROT S ACT/NOR PPP: >150 % (ref 71–117)
PROT S PPP-ACNC: 112 % (ref 60–150)

## 2021-03-16 LAB
APTT SCREEN TO CONFIRM RATIO: 0.94 RATIO (ref 0–1.4)
B2 GLYCOPROT1 IGA SERPL IA-ACNC: 3.1
B2 GLYCOPROT1 IGG SERPL IA-ACNC: <0.6
B2 GLYCOPROT1 IGM SERPL IA-ACNC: <2.9
CARDIOLIPIN IGA SER IA-ACNC: 3.1
CARDIOLIPIN IGG SER IA-ACNC: 7.7
CARDIOLIPIN IGM SER IA-ACNC: 1.1
CONFIRM APTT/NORMAL: 51.1 SEC (ref 0–55)
DRVVT IMM 1:2 NP PPP: 57.2 SEC (ref 0–40.4)
DRVVT SCREEN TO CONFIRM RATIO: 1.3 RATIO (ref 0.8–1.2)
LA PPP-IMP: ABNORMAL
SCREEN APTT: 39.9 SEC (ref 0–51.9)
SCREEN DRVVT: 75.6 SEC (ref 0–47)
THROMBIN TIME: 19.8 SEC (ref 0–23)

## 2021-03-18 LAB
F2 GENE MUT ANL BLD/T: NORMAL
F5 GENE MUT ANL BLD/T: NORMAL

## 2021-03-24 ENCOUNTER — OFFICE VISIT (OUTPATIENT)
Dept: INTERNAL MEDICINE CLINIC | Facility: CLINIC | Age: 56
End: 2021-03-24
Payer: COMMERCIAL

## 2021-03-24 ENCOUNTER — TELEPHONE (OUTPATIENT)
Dept: HEMATOLOGY ONCOLOGY | Facility: CLINIC | Age: 56
End: 2021-03-24

## 2021-03-24 VITALS
OXYGEN SATURATION: 98 % | DIASTOLIC BLOOD PRESSURE: 78 MMHG | SYSTOLIC BLOOD PRESSURE: 128 MMHG | BODY MASS INDEX: 28.23 KG/M2 | HEART RATE: 70 BPM | HEIGHT: 73 IN | WEIGHT: 213 LBS | TEMPERATURE: 98.4 F

## 2021-03-24 DIAGNOSIS — Z11.59 NEED FOR HEPATITIS C SCREENING TEST: ICD-10-CM

## 2021-03-24 DIAGNOSIS — Z12.9 CANCER SCREENING: ICD-10-CM

## 2021-03-24 DIAGNOSIS — I26.99 BILATERAL PULMONARY EMBOLISM (HCC): Primary | ICD-10-CM

## 2021-03-24 DIAGNOSIS — Z11.4 SCREENING FOR HIV (HUMAN IMMUNODEFICIENCY VIRUS): ICD-10-CM

## 2021-03-24 DIAGNOSIS — N50.89 TESTICULAR MICROLITHIASIS: ICD-10-CM

## 2021-03-24 DIAGNOSIS — T78.40XA ALLERGY, INITIAL ENCOUNTER: ICD-10-CM

## 2021-03-24 DIAGNOSIS — R73.03 PRE-DIABETES: ICD-10-CM

## 2021-03-24 PROCEDURE — 99214 OFFICE O/P EST MOD 30 MIN: CPT | Performed by: INTERNAL MEDICINE

## 2021-03-24 NOTE — ASSESSMENT & PLAN NOTE
· Repeat HbA1C improved to 6 0 from 6/3 in January  · Currently managing with lifestyle modifications  · Lipid panel with marginally elevated TG, LDL 77    Plan  1  Will hold off on initiating medications at this point, as HbA1C has shown mild improvement within 1 5 months  2  Routing HbA1C monitoring in 6 months  3   Continue lifestyle interventions, discussed on exercise and diet

## 2021-03-24 NOTE — ASSESSMENT & PLAN NOTE
· Unprovoked, in January 2021  · Eliquis was changed to [facility] in his last visit on 3/11 due to side effects  · Echo in 1/29 with severe pulm hypertension with akinetic RV; repeat echo pending  · Thrombosis panel: positive for lupus anticoagulant, increased DRVVT  Protein S is elevated which is likely not significant  · Cancer screening: awaiting colonoscopy after 6 months of treatment with [facility] as he may need to be off AC prior to procedure  CT Abd/Pel with contrast unremarkable, PSA wnl  · COVID IgG negative  · Following with pulmonology and cardiology  · Denies chest pain, has feeling of extra heart beats, but does have PVCs in previous EKG, but symptoms improving    Plan  1  Continue Xeralto for at least through July 28th, may require life long anticoagulation given abnormal thrombosis panel  2  Hematology referral   3  Follow up on repeat echo (scheduled in May)  4  Colonoscopy after 6 months of AC therapy  5   Continue to follow with pulmonology and cardiology

## 2021-03-24 NOTE — ASSESSMENT & PLAN NOTE
· Noted in ultrasound: "Scattered testicular microlithiasis is present without intratesticular mass or other worrisome findings"  · Monthly testicular self - examinations  · Will consider urology follow up

## 2021-03-24 NOTE — ASSESSMENT & PLAN NOTE
· Patient is due for his colonoscopy  · As patient is on anticoagulation, will schedule this in 6 months  · No personal history of cancer, no family history of GI malignancies

## 2021-03-24 NOTE — TELEPHONE ENCOUNTER
New Patient Encounter    New Patient Intake Form   Patient Details:  Warner Ward  1965  [id number]    Background Information:  [address] starts by opening a telephone encounter and gathering the following information   Who is calling to schedule? If not self, relationship to patient? office   Referring Provider Jah Ulloa   What is the diagnosis? PE   Is this diagnosis confirmed? Yes   When was the diagnosis? 1/2021   Is there a confirmed diagnosis from a biopsy/tissue reviewed by pathology? Were outside slides requested? Is patient aware of diagnosis? Yes   Is there a personal history and what kind? Is there a family history and what kind? Reason for visit? New Diagnosis   Have you had any imaging or labs done? If so: when, where? yes  [facility]   Are records in [employer]? yes   If patient has a prior history of breast cancer were old records obtained? Was the patient told to bring a disk? No   Does the patient smoke or Vape? No   If yes, how many packs or cartridges per day? Scheduling Information:   Preferred [place]: [facility]     Are there any dates/time the patient cannot be seen? Miscellaneous:    After completing the above information, please route to Financial Counselor and the appropriate Nurse Navigator for review

## 2021-03-24 NOTE — PROGRESS NOTES
INTERNAL MEDICINE FOLLOW-UP OFFICE VISIT  [facility] - [facility] INTERNAL MEDICINE MARY    NAME: Letty Worrell  AGE: 61 y o  SEX: male  : 1965     DATE: 3/24/2021     Assessment and Plan:     Problem List Items Addressed This Visit        Cardiovascular and Mediastinum    Bilateral pulmonary embolism ([address] ) - Primary     · Unprovoked, in 2021  · Eliquis was changed to [facility] in his last visit on 3/11 due to side effects  · Echo in  with severe pulm hypertension with akinetic RV; repeat echo pending  · Thrombosis panel: positive for lupus anticoagulant, increased DRVVT  Protein S is elevated which is likely not significant  · Cancer screening: awaiting colonoscopy after 6 months of treatment with [facility] as he may need to be off AC prior to procedure  CT Abd/Pel with contrast unremarkable, PSA wnl  · COVID IgG negative  · Following with pulmonology and cardiology  · Denies chest pain, has feeling of extra heart beats, but does have PVCs in previous EKG, but symptoms improving    Plan  1  Continue Xeralto for at least through , may require life long anticoagulation given abnormal thrombosis panel  2  Hematology referral   3  Follow up on repeat echo (scheduled in May)  4  Colonoscopy after 6 months of AC therapy  5  Continue to follow with pulmonology and cardiology            Other    Pre-diabetes     · Repeat HbA1C improved to 6 0 from 6/3 in January  · Currently managing with lifestyle modifications  · Lipid panel with marginally elevated TG, LDL 77    Plan  6  Will hold off on initiating medications at this point, as HbA1C has shown mild improvement within 1 5 months  7  Routing HbA1C monitoring in 6 months  8   Continue lifestyle interventions, discussed on exercise and diet         Cancer screening     · Patient is due for his colonoscopy  · As patient is on anticoagulation, will schedule this in 6 months  · No personal history of cancer, no family history of GI malignancies         Testicular microlithiasis     · Noted in ultrasound: "Scattered testicular microlithiasis is present without intratesticular mass or other worrisome findings"  · Monthly testicular self - examinations  · Will consider urology follow up         Allergies     · Patient has seasonal allergies, with shortness of breath when severe  · Patient used Flonase previously, however, with initiation of AC, patient has had occasional nose bleeds  Therefore will try Zertec during allergy season  · If symptoms worsen especially with current PE, we can consider additional bronchodilators, however discussed with patient that this can increase his PVC burden  No follow-ups on file  Chief Complaint:     Chief Complaint   Patient presents with    Follow-up     6 week f/u, discuss labs        History of Present Illness:     Mr Zoe Nowak is here today for his follow up office visit  He was last seen on 3/11/21, where we changed his AC to xeralto due to side effects  His side effects have improved, although he still does not feel all that well  No shortness of breath, chest pain  Has occasional "additional heart beats", but no syncope  Overall feels an improvement in symptoms  He is compliant with his medications  He is working on lifestyle modifications  He is now starting to get allergies, and we discussed about possible management of this  We further discussed about his lab work today and follow up information  See above for more details    The following portions of the patient's history were reviewed and updated as appropriate: allergies, current medications, past family history, past medical history, past social history, past surgical history and problem list      Review of Systems:     Review of Systems   Constitutional: Positive for fatigue  Negative for chills, diaphoresis, fever and unexpected weight change  HENT: Positive for nosebleeds (occasional) and sore throat   Negative for postnasal drip, sinus pressure and sinus pain  Eyes: Negative  Negative for photophobia and visual disturbance  Respiratory: Negative for cough, chest tightness, shortness of breath and wheezing  Cardiovascular: Negative for chest pain, palpitations and leg swelling  Gastrointestinal: Positive for constipation  Negative for abdominal pain, blood in stool, diarrhea, nausea and vomiting  Genitourinary: Negative  Negative for dysuria, hematuria and urgency  Musculoskeletal: Negative  Skin: Negative  Neurological: Negative  Negative for dizziness, seizures, syncope, light-headedness and headaches  Hematological: Does not bruise/bleed easily  Psychiatric/Behavioral: The patient is nervous/anxious  All other systems reviewed and are negative  Problem List:     Patient Active Problem List   Diagnosis    Bilateral pulmonary embolism (HCC)    Leukocytosis    Elevated troponin    Cardiac ischemia    [address] [facility] [address] [address] [address]    Hyperglycemia    Pre-diabetes    Cancer screening    Screen for colon cancer    Anticoagulant long-term use    Dyspepsia    Testicular microlithiasis    Allergies        Objective:     /78 (BP Location: Left arm, Patient Position: Sitting, Cuff Size: Adult)   Pulse 70   Temp 98 4 °F (36 9 °C) (Tympanic)   Ht 6' 1" (1 854 m)   Wt 96 6 kg (213 lb)   SpO2 98%   BMI 28 10 kg/m²     Physical Exam  Vitals signs reviewed  Constitutional:       General: He is not in acute distress  Appearance: He is not ill-appearing or toxic-appearing  HENT:      Head: Normocephalic  Ears:      Comments: Loud P2     Nose: Nose normal       Mouth/Throat:      Mouth: Mucous membranes are moist       Pharynx: Oropharynx is clear  No oropharyngeal exudate  Eyes:      General: No scleral icterus  Conjunctiva/sclera: Conjunctivae normal    Cardiovascular:      Rate and Rhythm: Normal rate and regular rhythm  Pulses: Normal pulses        Heart sounds: No murmur  Pulmonary:      Effort: Pulmonary effort is normal       Breath sounds: Normal breath sounds  No wheezing or rales  Abdominal:      General: Abdomen is flat  Tenderness: There is no abdominal tenderness  Musculoskeletal:      Right lower leg: No edema  Left lower leg: No edema  Skin:     Capillary Refill: Capillary refill takes less than 2 seconds  Findings: No bruising  Neurological:      Mental Status: He is alert and oriented to person, place, and time  Mental status is at baseline  Comments: Fine tremors noted, B/L hands   Psychiatric:         Mood and Affect: Mood normal          Pertinent Laboratory/Diagnostic Studies:    Laboratory Results: I have personally reviewed the pertinent laboratory results/reports     CBC:   Results from Last 12 Months   Lab Units 01/30/21  0514   WBC Thousand/uL 13 03*   RBC Million/uL 4 67   HEMOGLOBIN g/dL 13 4   HEMATOCRIT % 42 0   MCV fL 90   MCH pg 28 7   MCHC g/dL 31 9   RDW % 13 8   MPV fL 10 5   PLATELETS Thousands/uL 153   NRBC AUTO /100 WBCs 0   NEUTROS PCT % 60   LYMPHS PCT % 27   MONOS PCT % 7   EOS PCT % 6   BASOS PCT % 0   NEUTROS ABS Thousands/µL 7 65*   LYMPHS ABS Thousands/µL 3 52   MONOS ABS Thousand/µL 0 92   EOS ABS Thousand/µL 0 84*     Chemistry Profile:   Results from Last 12 Months   Lab Units 01/30/21  0514 01/28/21  1947   POTASSIUM mmol/L 3 9 3 9   CHLORIDE mmol/L 109* 103   CO2 mmol/L 19* 22   BUN mg/dL 17 19   CREATININE mg/dL 1 01 1 65*   GLUCOSE RANDOM mg/dL 134 202*   CALCIUM mg/dL 9 4 10 1   MAGNESIUM mg/dL  --  2 2   AST U/L  --  16   ALT U/L  --  14   ALK PHOS U/L  --  84 7   EGFR ml/min/1 73sq m 82 45     Coagulation Studies:   Results from Last 12 Months   Lab Units 01/30/21  0514  01/29/21  0319   PROTIME seconds  --   --  16 4*   INR   --   --  1 35*   PTT seconds 69*   < >  --     < > = values in this interval not displayed       Cardiac Studies:   Results from Last 12 Months   Lab Units 01/29/21  0858  01/29/21  0319   NT-PRO BNP pg/mL  --   --  3,923*   TROPONIN I ng/mL 0 17*   < > 0 32*    < > = values in this interval not displayed  Endocrine Studies:   Results from Last 12 Months   Lab Units 03/13/21  0903   HEMOGLOBIN A1C % 6 0*   TRIGLYCERIDES mg/dL 150 3*   CHOLESTEROL mg/dL 159   HDL mg/dL 52   LDL CALC mg/dL 77     Hematology: No results for input(s): FOLATE, REOKOCDM65, LDH, IRF, RETICCTPCT, RETIC, RETICHGB in the last 8784 hours  Radiology/Other Diagnostic Testing Results: I have personally reviewed pertinent reports        Jessica Shahid MD  [facility] INTERNAL MEDICINE [address] [place] [address]

## 2021-03-24 NOTE — ASSESSMENT & PLAN NOTE
· Patient has seasonal allergies, with shortness of breath when severe  · Patient used Flonase previously, however, with initiation of AC, patient has had occasional nose bleeds  Therefore will try Zertec during allergy season  · If symptoms worsen especially with current PE, we can consider additional bronchodilators, however discussed with patient that this can increase his PVC burden

## 2021-03-25 NOTE — PROGRESS NOTES
Hematology/Oncology Outpatient Consult Note  Patric Arteaga 61 y o  male MRN: @ Encounter: [id number]        Date:  3/30/2021        CC:  Bilateral Pulmonary Embolism       HPI:  Patric Arteaga is a 62 yo male who was hospitalized in 1/2021 with symptoms of cough and shortness of breath  Work up demonstrated an extensive bilateral upper and lower lobar pulmonary embolism  His right ventricle was noted to be dilated consistent with right heart strain  Echocardiogram was completed, this demonstrated severe pulmonary hypertension and significantly enlarged right heart ventricle  He was started on a heparin drip and transitioned to Eliquis at discharge  His anticoagulation was changed to Xarelto a few weeks ago secondary to reported side effects related to Eliquis  Thrombosis panel was ordered by his PCP and completed on 3/13/21 results demonstrated the presence of a lupus anticoagulant  His Protein S was elevated  He has been referred to hematology by his PCP for evaluation given his abnormal thrombosis panel and recommendations for length of anticoagulation and is being seen for initial consultation 3/30/2021     Patient reports he was unable to tolerate Eliquis due to symptoms of bilateral hand tremors, nausea, upset stomach, abdominal discomfort and brain fog  He was switched to Xarelto 2 weeks ago and is tolerating this much better  He  States he continues to have some mild hand tremors but these are much improved  He is anxious and does endorse symptoms of anxiety especially when he thinks about his medical conditions  He denies any symptoms of abnormal bleeding or easy bruisability  He denies any chest pain, shortness of breath, lightheadedness or dizziness  He is following with Cardiology and is scheduled for repeat echocardiogram in May  He did have COVID antibodies tested to see if this could possibly resulted in his pulmonary embolism  His antibodies were negative    CT of abdomen pelvis was done while hospitalized to evaluate for any occult malignancy that may have resulted in hypercoagulable state  This was unremarkable with exception of some diverticulosis and mildly enlarged prostate  He is following with GI and planning to undergo screening colonoscopy  He will need to have his anticoagulation held a couple days prior to this procedure        Previous Hematologic History:    Eliquis    Current Hematologic History:   Xarelto    Test Results:    Imaging: Us Scrotum And Testicles    Result Date: 2/26/2021  Narrative: SCROTAL ULTRASOUND INDICATION:    Z12 9: Encounter for screening for malignant neoplasm, site unspecified  COMPARISON: None TECHNIQUE:   Ultrasound the scrotal contents was performed with a high frequency linear transducer utilizing volumetric sweep imaging as well as standard still image techniques  Imaging performed in longitudinal and transverse orientation  Color and spectral Doppler evaluation also performed bilaterally  FINDINGS: TESTES: Testes are symmetric and normal in size  RIGHT testis = 4 2 x 2 x 2 7 cm Normal contour with homogeneous smooth echotexture  No intratesticular mass lesion  A few tiny punctate calcifications are noted  LEFT testis = 4 x 1 9 x 2 7 cm Normal contour with homogeneous smooth echotexture  No intratesticular mass lesion  A few tiny punctate calcifications are noted  Doppler flow within both testes is present and symmetric  EPIDIDYMIDES: Normal Size  Doppler ultrasound demonstrates normal blood flow  Simple right epididymal cyst measuring 6 x 5 x 3 mm  HYDROCELE:  No significant fluid present  VARICOCELE:  None present  SCROTUM:  Scrotal thickness and appearance within normal limits  No evidence for extratesticular mass or hernia demonstrated  Impression: 1  Scattered testicular microlithiasis is present without intratesticular mass or other worrisome findings    In the absence of any other risk factors for testicular cancer (e g , personal history of testicular cancer, father or brother with testicular cancer, history of cryptorchidism for maldescent, testicular atrophy, or other risk factors), no further imaging or biochemical follow-up is necessary; all that is recommended is routine monthly testicular self-examination  However if the patient has risk factors for testicular cancer, evaluation and determination of an optimal follow-up strategy is deferred to urologist  (Reference: AJR 2016: [id number] ) 2  Subcentimeter right epididymal cyst  Workstation performed: [id number]     Ct Abdomen Pelvis W Contrast    Result Date: 2/27/2021  Narrative: CT ABDOMEN AND PELVIS WITH IV CONTRAST INDICATION:   Z12 9: Encounter for screening for malignant neoplasm, site unspecified  Recent pulmonary embolism  Concern for malignancy  COMPARISON:  CT, dated 1/28/2021  TECHNIQUE:  CT examination of the abdomen and pelvis was performed  Axial, sagittal, and coronal 2D reformatted images were created from the source data and submitted for interpretation  Radiation dose length product (DLP) for this visit:  713 9 mGy-cm   This examination, like all CT scans performed in the [facility], was performed utilizing techniques to minimize radiation dose exposure, including the use of iterative reconstruction and automated exposure control  IV Contrast:  100 mL of iohexol (OMNIPAQUE) Enteric Contrast:  Enteric contrast was not administered  FINDINGS: ABDOMEN LOWER CHEST:  No clinically significant abnormality identified in the visualized lower chest  LIVER/BILIARY TREE:  Unremarkable  GALLBLADDER:  No calcified gallstones  No pericholecystic inflammatory change  SPLEEN:  Unremarkable  PANCREAS:  Unremarkable  ADRENAL GLANDS:  Unremarkable  KIDNEYS/URETERS:  One or more simple renal cyst(s) is noted  Otherwise unremarkable kidneys  No hydronephrosis  STOMACH AND BOWEL:  Sigmoid and descending colon diverticulosis is present    There is no evidence of diverticulitis  Remainder of small and large bowel is normal   Stomach is normal  APPENDIX:  No findings to suggest appendicitis  ABDOMINOPELVIC CAVITY:  No ascites  No pneumoperitoneum  No lymphadenopathy  VESSELS:  Unremarkable for patient's age  PELVIS REPRODUCTIVE ORGANS:  The prostate is mildly enlarged  URINARY BLADDER:  Unremarkable  ABDOMINAL WALL/INGUINAL REGIONS:  Unremarkable  OSSEOUS STRUCTURES:  Note is made of a hemangioma in the T12 vertebral body  Otherwise unremarkable  Impression: 1  No CT evidence for malignancy, as queried  2   Sigmoid and descending colon diverticulosis  3   Mild prostatic enlargement  Workstation performed: [id number]       Labs:   Lab Results   Component Value Date    WBC 13 03 (H) 01/30/2021    HGB 13 4 01/30/2021    HCT 42 0 01/30/2021    MCV 90 01/30/2021     01/30/2021     Lab Results   Component Value Date    K 3 9 01/30/2021     (H) 01/30/2021    CO2 19 (L) 01/30/2021    BUN 17 01/30/2021    CREATININE 1 01 01/30/2021    CALCIUM 9 4 01/30/2021    AST 16 01/28/2021    ALT 14 01/28/2021    ALKPHOS 84 7 01/28/2021    EGFR 82 01/30/2021             ROS:  Review of Systems   Neurological: Positive for tremors (mild hand)  Psychiatric/Behavioral: The patient is nervous/anxious  All other systems reviewed and are negative        Active Problems:   Patient Active Problem List   Diagnosis    Bilateral pulmonary embolism (HCC)    Leukocytosis    Elevated troponin    Cardiac ischemia    TANG [facility]acute [facility]) ([facility] [address] )    Hyperglycemia    Pre-diabetes    Cancer screening    Screen for colon cancer    Anticoagulant long-term use    Dyspepsia    Testicular microlithiasis    Allergies       Past Medical History:   Past Medical History:   Diagnosis Date    Allergic     Diverticulosis        Surgical History:   Past Surgical History:   Procedure Laterality Date    VASECTOMY         Family History:    Family History   Problem Relation Age of Onset    Heart disease Mother     Cancer Mother     Testicular cancer Father     Sleep apnea Brother        Cancer-related family history includes Cancer in his mother; Testicular cancer in his father  Social History:   Social History     Socioeconomic History    Marital status: /Civil Union     Spouse name: Not on file    Number of children: Not on file    Years of education: Not on file    Highest education level: Not on file   Occupational History    Not on file   Social Needs    Financial resource strain: Not hard at all   Anaya-Letty insecurity     Worry: Never true     Inability: Never true   [employer] needs     Medical: No     Non-medical: No   Tobacco Use    Smoking status: Never Smoker    Smokeless tobacco: Never Used   Substance and Sexual Activity    Alcohol use: Yes     Frequency: Monthly or less     Drinks per session: 1 or 2     Comment: occasionally    Drug use: Never    Sexual activity: Yes   Lifestyle    Physical activity     Days per week: 0 days     Minutes per session: Not on file    Stress: Only a little   Relationships    Social connections     Talks on phone:  Three times a week     Gets together: Twice a week     Attends Mu-ism service: Never     Active member of club or organization: No     Attends meetings of clubs or organizations: Never     Relationship status:     Intimate partner violence     Fear of current or ex partner: No     Emotionally abused: No     Physically abused: No     Forced sexual activity: No   Other Topics Concern    Not on file   Social History Narrative    Not on file       Current Medications:   Current Outpatient Medications   Medication Sig Dispense Refill    Ascorbic Acid (VITAMIN C PO) Take 250 mg by mouth daily       psyllium (METAMUCIL) 58 6 % powder Take 1 packet by mouth daily       rivaroxaban (XARELTO) 20 mg tablet Take 1 tablet (20 mg total) by mouth daily with breakfast 30 tablet 5    VITAMIN D PO Take 1,000 Units by mouth daily       Na Sulfate-K Sulfate-Mg Sulf (Suprep Bowel Prep Kit) 17 5-3 13-1 6 GM/177ML SOLN Take 2 Bottles by mouth see administration instructions Please follow the instructions from the office (Patient not taking: Reported on 3/24/2021) 2 Bottle 0     No current facility-administered medications for this visit  Allergies: No Known Allergies      Physical Exam:    Body surface area is 2 21 meters squared  Wt Readings from Last 3 Encounters:   03/30/21 96 2 kg (212 lb)   03/24/21 96 6 kg (213 lb)   03/11/21 98 4 kg (217 lb)        Temp Readings from Last 3 Encounters:   03/30/21 98 1 °F (36 7 °C)   03/24/21 98 4 °F (36 9 °C) (Tympanic)   03/11/21 98 8 °F (37 1 °C) (Tympanic)        BP Readings from Last 3 Encounters:   03/30/21 140/80   03/24/21 128/78   03/11/21 136/82         Pulse Readings from Last 3 Encounters:   03/30/21 72   03/24/21 70   03/11/21 83          Physical Exam  Vitals signs reviewed  Constitutional:       General: He is not in acute distress  Appearance: He is well-developed  He is not diaphoretic  HENT:      Head: Normocephalic and atraumatic  Mouth/Throat:      Pharynx: No oropharyngeal exudate  Eyes:      General: No scleral icterus  Pupils: Pupils are equal, round, and reactive to light  Neck:      Musculoskeletal: Normal range of motion and neck supple  Cardiovascular:      Rate and Rhythm: Normal rate and regular rhythm  Heart sounds: Normal heart sounds  No murmur  Pulmonary:      Effort: Pulmonary effort is normal  No respiratory distress  Breath sounds: Normal breath sounds  Abdominal:      General: Abdomen is flat  Bowel sounds are normal  There is no distension  Palpations: Abdomen is soft  There is no mass  Tenderness: There is no abdominal tenderness  Musculoskeletal: Normal range of motion  Lymphadenopathy:      Cervical: No cervical adenopathy  Skin:     General: Skin is warm and dry     Neurological: General: No focal deficit present  Mental Status: He is alert and oriented to person, place, and time  Psychiatric:         Mood and Affect: Mood is anxious  Behavior: Behavior normal          Thought Content: Thought content normal          Judgment: Judgment normal             Assessment/ Plan:    1  Bilateral pulmonary embolism [facility])     The patient is a 62-year-old gentleman who was hospitalized in January of this year with symptoms of shortness of breath and chest pressure  Workup demonstrated a bilateral pulmonary embolism with significant right heart strain  He was started on a heparin drip and transitioned to Eliquis at discharge  His anticoagulation was changed to Xarelto a few weeks ago secondary to reported side effects related to Eliquis  Thrombosis panel was ordered by his PCP and completed on 3/13/21 results demonstrated the presence of a lupus anticoagulant  His Protein S was elevated    Time spent reviewing results of imaging, echocardiogram, and thrombosis panel with patient today  His thrombosis panel showed elevated protein S antigen which is not clinically significant   Protein S deficiency is what causes increased risk of thromboses  I also reviewed that his hypercoagulable workup was done while on anticoagulation and this can affect the results,  Specifically in relation to the presence of lupus anticoagulant  His cardiolipin antibodies were negative,  Beta 2 glycoprotein antibodies negative  Regardless of these results, I explained that he will require lifelong anticoagulation given the significance of his pulmonary embolism with associated right heart strain  Patient was disappointed with this recommendation but verbalized understanding that he is at greater risk of recurrent PE if he goes off anticoagulation  I would recommend repeat CTA of chest in 4-6 months to check for clot resolution along with a repeat D-dimer  He should follow with Cardiology as planned   If patient develops any concerning side effects while on Xarelto could consider transition to Pradaxa which is a direct thrombin inhibitor and acts differently than the factor Xa inhibitors or Coumadin  Patient was informed that his PCP would continue to manage his anticoagulation  He does not require routine follow up with hematology  Patient verbalized understanding of recommendation for lifelong anticoagulation  Time spent answering his questions to the best my ability and to his stated satisfaction  He may follow with Hematology on an as-needed basis     Barriers to care: None  Patient  able to self care  Portions of the record may have been created with voice recognition software  Occasional wrong word or "sound a like" substitutions may have occurred due to the inherent limitations of voice recognition software  Read the chart carefully and recognize, using context, where substitutions have occurred

## 2021-03-30 ENCOUNTER — CONSULT (OUTPATIENT)
Dept: HEMATOLOGY ONCOLOGY | Facility: CLINIC | Age: 56
End: 2021-03-30
Payer: COMMERCIAL

## 2021-03-30 VITALS
DIASTOLIC BLOOD PRESSURE: 80 MMHG | HEART RATE: 72 BPM | RESPIRATION RATE: 16 BRPM | BODY MASS INDEX: 28.1 KG/M2 | TEMPERATURE: 98.1 F | HEIGHT: 73 IN | SYSTOLIC BLOOD PRESSURE: 140 MMHG | WEIGHT: 212 LBS

## 2021-03-30 DIAGNOSIS — I26.99 BILATERAL PULMONARY EMBOLISM (HCC): Primary | ICD-10-CM

## 2021-03-30 PROCEDURE — 3008F BODY MASS INDEX DOCD: CPT | Performed by: NURSE PRACTITIONER

## 2021-03-30 PROCEDURE — 99205 OFFICE O/P NEW HI 60 MIN: CPT | Performed by: NURSE PRACTITIONER

## 2021-03-30 PROCEDURE — 1036F TOBACCO NON-USER: CPT | Performed by: NURSE PRACTITIONER

## 2021-04-09 DIAGNOSIS — I26.94 MULTIPLE SUBSEGMENTAL PULMONARY EMBOLI WITHOUT ACUTE COR PULMONALE (HCC): ICD-10-CM

## 2021-04-09 NOTE — TELEPHONE ENCOUNTER
Dempsey Fabry has called the office to request a medication refill  Nani Villanueva has requested a refill of rivaroxaban (XARELTO) 20 mg tablet to be sent to the [facility] on [address]

## 2021-04-12 ENCOUNTER — OFFICE VISIT (OUTPATIENT)
Dept: PULMONOLOGY | Facility: CLINIC | Age: 56
End: 2021-04-12
Payer: COMMERCIAL

## 2021-04-12 VITALS
DIASTOLIC BLOOD PRESSURE: 82 MMHG | OXYGEN SATURATION: 98 % | SYSTOLIC BLOOD PRESSURE: 132 MMHG | HEART RATE: 70 BPM | BODY MASS INDEX: 27.87 KG/M2 | TEMPERATURE: 98.1 F | WEIGHT: 210.25 LBS | HEIGHT: 73 IN

## 2021-04-12 DIAGNOSIS — I26.99 BILATERAL PULMONARY EMBOLISM (HCC): Primary | ICD-10-CM

## 2021-04-12 PROCEDURE — 99214 OFFICE O/P EST MOD 30 MIN: CPT | Performed by: INTERNAL MEDICINE

## 2021-04-12 NOTE — ASSESSMENT & PLAN NOTE
This patient appears to have no persistent symptoms of pulmonary hypertension  No physical evidence of this condition now  He is now on relatively long-term anticoagulation now with Xarelto because of side effects from Eliquis  However he feels like he is having symptoms comes from Xarelto similar to Eliquis primarily of tremor and heart racing and would like to consider switching to Pradaxa as suggested by Dr Alyssa Jones  Note made of abnormal testing for lupus anticoagulant  There is a statement in Hematology note about abnormal results related to anticoagulation so the question is whether this should be repeated when the patient is not on anticoagulation  I would like to discuss this directly with Dr Alyssa Jones  Patient scheduled for an echocardiogram to follow-up on pulmonary hypertension next month  If this testing is normal I see very little value in doing a repeat CTA  Main question for Dr Alyssa Jones is should the patient have more testing for relatively uncommon forms of thrombophilia before deciding on lifelong anticoagulation  Note that so far testing for malignancy is negative with colonoscopy scheduled for July

## 2021-04-12 NOTE — LETTER
April 12, 2021     MD Karen Ferris [facility] [id number]    Patient: Ellen Julien   YOB: 1965   Date of Visit: 4/12/2021       Dear Dr Ave Inman: Thank you for referring Edwyna Monday to me for evaluation  Below are the relevant portions of my assessment and plan of care  There are no diagnoses linked to this encounter  If you have questions, please do not hesitate to call me  I look forward to following Tasha Messina along with you           Sincerely,        Davon Latif MD        CC: Nalini Kendall MD

## 2021-04-12 NOTE — PROGRESS NOTES
Assessment/Plan:    Bilateral pulmonary embolism ([address] )  This patient appears to have no persistent symptoms of pulmonary hypertension  No physical evidence of this condition now  He is now on relatively long-term anticoagulation now with Xarelto because of side effects from Eliquis  However he feels like he is having symptoms comes from Xarelto similar to Eliquis primarily of tremor and heart racing and would like to consider switching to Pradaxa as suggested by Dr Kay Gonzalez  Note made of abnormal testing for lupus anticoagulant  There is a statement in Hematology note about abnormal results related to anticoagulation so the question is whether this should be repeated when the patient is not on anticoagulation  I would like to discuss this directly with Dr Kay Gonzalez  Patient scheduled for an echocardiogram to follow-up on pulmonary hypertension next month  If this testing is normal I see very little value in doing a repeat CTA  Main question for Dr Kay Gonzalez is should the patient have more testing for relatively uncommon forms of thrombophilia before deciding on lifelong anticoagulation  Note that so far testing for malignancy is negative with colonoscopy scheduled for July  There are no diagnoses linked to this encounter  Subjective:      Patient ID: Alexandra Dean is a 61 y o  male  This patient is here in follow-up for acute pulmonary embolism from January 29th  Patient now feels fairly well in terms of dyspnea  No problems with chest pain or near syncope episodes  Reviewed the patient's initial history and physical   Patient disputes the claims that he had near-syncope before presentation  He simply remembers the chest pain and severe dyspnea on exertion  Recently has had issues with Eliquis causing symptoms of hand tremor and sense of racing heart or adrenergic side effects  Switch to Xarelto with less but similar symptoms  He has been advised to consider switching to Pradaxa    No bleeding issues  Does do exercise on a daily basis  Patient has allergic symptoms which she has routinely ever spring mainly with nose and sinus congestion  Some impairment of hearing  Previously took [place] but has been advised against this drug  Senses some vague chest congestion or wheezing when allergies are active  Does not appear to cause for routine dyspnea  Patient had a negative CT scan of the abdomen and negative testicular ultrasound  Has routine colonoscopy scheduled for July  Presumably will need to be off anticoagulation for several days before this procedure  Patient had a visit with Dr Trang Elder and nurse practitioner Jeramie Weber  Had been advised lifelong anticoagulation  Patient not very happy with this recommendation  Also advised to have a routine CT a after 6 months as well as a D-dimer presumably while on anticoagulation  40 minutes visit with extensive discussion  Review of Hematology notes  Review of initial history and physical     primary symptoms  Associated symptoms include congestion  Pertinent negatives include no chest pain, coughing, fever, headaches, myalgias or sore throat  The following portions of the patient's history were reviewed and updated as appropriate: allergies, current medications, past family history, past medical history, past social history, past surgical history and problem list     Review of Systems   Constitutional: Negative for activity change, appetite change and fever  HENT: Positive for congestion and sinus pressure  Negative for ear pain, postnasal drip, rhinorrhea, sneezing, sore throat and trouble swallowing  Respiratory: Positive for chest tightness and wheezing (May be)  Negative for cough and shortness of breath  Cardiovascular: Negative for chest pain  Musculoskeletal: Negative for myalgias  Allergic/Immunologic: Positive for environmental allergies  Neurological: Negative for headaches           Objective:      /82 (BP Location: Left arm, Patient Position: Sitting, Cuff Size: Adult)   Pulse 70   Temp 98 1 °F (36 7 °C) (Tympanic)   Ht 6' 1" (1 854 m)   Wt 95 4 kg (210 lb 4 oz)   SpO2 98%   BMI 27 74 kg/m²          Physical Exam  Vitals signs reviewed  Constitutional:       Appearance: He is normal weight  He is not ill-appearing  Neck:      Musculoskeletal: No neck rigidity or muscular tenderness  Vascular: No JVD  Cardiovascular:      Rate and Rhythm: Normal rate and regular rhythm  Pulses:           Radial pulses are 3+ on the right side and 3+ on the left side  Heart sounds: Normal heart sounds  Pulmonary:      Effort: Pulmonary effort is normal       Breath sounds: Normal breath sounds  No stridor  No wheezing or rales  Musculoskeletal:         General: Swelling (Left thigh) present  Right lower leg: No edema  Left lower leg: No edema  Lymphadenopathy:      Cervical: No cervical adenopathy  Skin:     General: Skin is warm and dry  Neurological:      Mental Status: He is alert and oriented to person, place, and time     Psychiatric:         Mood and Affect: Mood normal          Behavior: Behavior normal          Answers for HPI/ROS submitted by the patient on 4/6/2021   Primary symptoms  Do you have shortness of breath that occurs with effort or exertion?: No  Do you have ear congestion?: No  Do you have heartburn?: No  Do you have fatigue?: No  Do you have nasal congestion?: No  Do you have shortness of breath when lying flat?: No  Do you have shortness of breath when you wake up?: No  Do you have sweats?: No  Have you experienced weight loss?: No  Which of the following makes your symptoms worse?: pollen

## 2021-04-14 ENCOUNTER — OFFICE VISIT (OUTPATIENT)
Dept: INTERNAL MEDICINE CLINIC | Facility: CLINIC | Age: 56
End: 2021-04-14
Payer: COMMERCIAL

## 2021-04-14 VITALS
OXYGEN SATURATION: 97 % | HEIGHT: 73 IN | HEART RATE: 71 BPM | SYSTOLIC BLOOD PRESSURE: 138 MMHG | TEMPERATURE: 98.6 F | BODY MASS INDEX: 28.15 KG/M2 | WEIGHT: 212.4 LBS | DIASTOLIC BLOOD PRESSURE: 80 MMHG

## 2021-04-14 DIAGNOSIS — Z12.9 CANCER SCREENING: ICD-10-CM

## 2021-04-14 DIAGNOSIS — I26.99 BILATERAL PULMONARY EMBOLISM (HCC): Primary | ICD-10-CM

## 2021-04-14 DIAGNOSIS — R73.03 PRE-DIABETES: ICD-10-CM

## 2021-04-14 PROCEDURE — 1036F TOBACCO NON-USER: CPT | Performed by: INTERNAL MEDICINE

## 2021-04-14 PROCEDURE — 99911: CPT | Performed by: INTERNAL MEDICINE

## 2021-04-14 PROCEDURE — 3008F BODY MASS INDEX DOCD: CPT | Performed by: INTERNAL MEDICINE

## 2021-04-14 PROCEDURE — 99214 OFFICE O/P EST MOD 30 MIN: CPT | Performed by: INTERNAL MEDICINE

## 2021-04-14 RX ORDER — DABIGATRAN ETEXILATE 150 MG/1
150 CAPSULE, COATED PELLETS ORAL 2 TIMES DAILY
Qty: 60 CAPSULE | Refills: 0 | Status: SHIPPED | OUTPATIENT
Start: 2021-05-08 | End: 2021-05-17 | Stop reason: SINTOL

## 2021-04-14 NOTE — ASSESSMENT & PLAN NOTE
· Unprovoked, in January 2021  · Eliquis was changed to [facility] in his last visit on 3/11 due to side effects  · Echo in 1/29 with severe pulm hypertension with akinetic RV; repeat echo pending  · Thrombosis panel: positive for lupus anticoagulant, increased DRVVT  Protein S is elevated which is not significant  · Cancer screening: awaiting colonoscopy after 6 months of treatment with [facility] as he may need to be off AC prior to procedure  CT Abd/Pel with contrast unremarkable, PSA wnl  · COVID IgG negative  · Following with hematology, pulmonology and cardiology  · Denies chest pain,SOB and no more has feeling of extra beats  · Patient endorses tremors and racing thoughts while on xarelto      Plan  · Will Switch anticoagulation to pradaxa, discussed possible side effects and patient agreeable  · Patient prefers to go back on xarelto if he develops intolerable side effects from pradaxa, prefers avoiding coumadin and lovenox  · Follow up on repeat echo (scheduled in May)  · Colonoscopy after 6 months of AC therapy which is due in 3 months, patient to call our clinic once this is scheduled to coordinate ordering coagulation panel as he will be off of anticoagulation for couple of days prior to procedure  · Continue to follow with pulmonology and cardiology    · No indication to repeat CTA PE study and will follow up on Echo results first

## 2021-04-14 NOTE — ASSESSMENT & PLAN NOTE
HBA1c 6  Discussed lifestyle changes  No indication to initiate any medications in the meantime   Routine HbA1c check

## 2021-04-14 NOTE — ASSESSMENT & PLAN NOTE
· CT abdomen/pelvis showed no masses, unremarkable except of colonic diverticulosis and mildly enlarged prostate  · Referral to GI placed last visit, colonoscopy will be scheduled in the future, currently on hold given anticoagulation

## 2021-04-14 NOTE — PROGRESS NOTES
Assessment/Plan:    Cancer screening  · CT abdomen/pelvis showed no masses, unremarkable except of colonic diverticulosis and mildly enlarged prostate  · Referral to GI placed last visit, colonoscopy will be scheduled in the future, currently on hold given anticoagulation    Pre-diabetes  HBA1c 6  Discussed lifestyle changes  No indication to initiate any medications in the meantime  Routine HbA1c check     Bilateral pulmonary embolism (HCC)  · Unprovoked, in January 2021  · Eliquis was changed to [facility] in his last visit on 3/11 due to side effects  · Echo in 1/29 with severe pulm hypertension with akinetic RV; repeat echo pending  · Thrombosis panel: positive for lupus anticoagulant, increased DRVVT  Protein S is elevated which is not significant  · Cancer screening: awaiting colonoscopy after 6 months of treatment with [facility] as he may need to be off AC prior to procedure  CT Abd/Pel with contrast unremarkable, PSA wnl  · COVID IgG negative  · Following with hematology, pulmonology and cardiology  · Denies chest pain,SOB and no more has feeling of extra beats  · Patient endorses tremors and racing thoughts while on xarelto      Plan  · Will Switch anticoagulation to pradaxa, discussed possible side effects and patient agreeable  · Patient prefers to go back on xarelto if he develops intolerable side effects from pradaxa, prefers avoiding coumadin and lovenox  · Follow up on repeat echo (scheduled in May)  · Colonoscopy after 6 months of AC therapy which is due in 3 months, patient to call our clinic once this is scheduled to coordinate ordering coagulation panel as he will be off of anticoagulation for couple of days prior to procedure  · Continue to follow with pulmonology and cardiology  · No indication to repeat CTA PE study and will follow up on Echo results first        Diagnoses and all orders for this visit:    Bilateral pulmonary embolism (HCC)  -     dabigatran etexilate (PRADAXA) 150 mg capsu;  Take 1 capsule (150 mg total) by mouth 2 (two) times a day    Cancer screening    Pre-diabetes          Subjective:      Patient ID: Steph Botello is a 61 y o  male  This is a pleasant 24-year-old male who is here to with our team anticoagulation for his bilateral  Pulmonary embolism that was diagnosed recently  He is currently Xarelto that was switched last from Eliquis due to side effects  He still experiencing similar side effects on Xarelto as well including tremors and racing thoughts however no more extra beats noted by him  He also denies any chest pain, shortness of breath at rest or exertion  Patient would like to discuss other options for anticoagulation  Discussed Pradaxa with him which he will switch to, prescription was sent to his pharmacy  Side effects were discussed with patient and he is agreeable  Patient stated that if he develops intolerable side effects to Pradaxa then he wishes to go back on Xarelto, discussed that we will document that if needed and send it to insurance for approval if any issue arises then  Patient was seen by Hematology who recommended lifelong anticoagulation  He was also seen by pulmonology recommended to follow up on echo results and no need to repeat CTA PE study, pulmonology is discussing duration of anticoagulation with Hematology in the meantime  Patient will undergo colonoscopy roughly in 3 months as he needs to be off of anticoagulation and we are waiting for at least 6 months since treatment start  Patient was advised to call our clinic once his colonoscopy scheduled so we can coordinate ordering coagulation panel when he is off of his anticoagulation in anticipation of the procedure  discussed vaccinations with patient including Tdap, pneumococcal vaccine and COVID 19  Patient stated that he will think about it and call our clinic once he makes a decision  Patient had concerns about Nia Ohms vaccine with the reported clots    Discussed with patient that he can have the other kinds of COVID-19 vaccine as him getting the infection will put him at risk of clots, patient is in the contemplation phase and will call our clinic once he makes a decision  Will follow-up with patient in 2 months in our clinic  The following portions of the patient's history were reviewed and updated as appropriate: allergies, current medications, past family history, past medical history, past social history, past surgical history and problem list     Review of Systems   Constitutional: Negative for activity change, appetite change, chills, diaphoresis and fever  HENT: Negative for congestion, sore throat and trouble swallowing  Respiratory: Negative for cough, shortness of breath and wheezing  Cardiovascular: Negative for chest pain, palpitations and leg swelling  Gastrointestinal: Negative for abdominal distention, constipation, diarrhea, nausea and vomiting  Genitourinary: Negative for difficulty urinating and dysuria  Musculoskeletal: Negative for arthralgias, back pain and gait problem  Skin: Negative for pallor and rash  Neurological: Positive for tremors  Negative for dizziness, weakness, light-headedness and headaches  Psychiatric/Behavioral: Negative for confusion  The patient is not nervous/anxious  All other systems reviewed and are negative  Objective:      /80 (BP Location: Left arm, Patient Position: Sitting, Cuff Size: Standard)   Pulse 71   Temp 98 6 °F (37 °C) (Tympanic)   Ht 6' 1" (1 854 m)   Wt 96 3 kg (212 lb 6 4 oz)   SpO2 97%   BMI 28 02 kg/m²          Physical Exam  Vitals signs reviewed  Constitutional:       General: He is not in acute distress  Appearance: He is not ill-appearing or diaphoretic  HENT:      Head: Normocephalic and atraumatic  Nose: Nose normal       Mouth/Throat:      Mouth: Mucous membranes are moist       Pharynx: Oropharynx is clear     Eyes:      General: No scleral icterus  Extraocular Movements: Extraocular movements intact  Conjunctiva/sclera: Conjunctivae normal    Neck:      Musculoskeletal: Normal range of motion and neck supple  Cardiovascular:      Rate and Rhythm: Normal rate and regular rhythm  Heart sounds: Normal heart sounds  No murmur  Pulmonary:      Effort: Pulmonary effort is normal  No respiratory distress  Breath sounds: Normal breath sounds  No wheezing, rhonchi or rales  Chest:      Chest wall: No tenderness  Abdominal:      General: Bowel sounds are normal       Palpations: Abdomen is soft  Tenderness: There is no abdominal tenderness  Musculoskeletal: Normal range of motion  Right lower leg: No edema  Left lower leg: No edema  Skin:     General: Skin is warm and dry  Capillary Refill: Capillary refill takes less than 2 seconds  Coloration: Skin is not pale  Findings: No bruising, erythema, lesion or rash  Neurological:      General: No focal deficit present  Mental Status: He is alert and oriented to person, place, and time  Psychiatric:         Mood and Affect: Mood normal          Behavior: Behavior normal          BMI Counseling: Body mass index is 28 02 kg/m²  The BMI is above normal  Nutrition recommendations include reducing portion sizes, decreasing overall calorie intake, 3-5 servings of fruits/vegetables daily, reducing fast food intake, consuming healthier snacks, decreasing soda and/or juice intake, moderation in carbohydrate intake, increasing intake of lean protein, reducing intake of saturated fat and trans fat and reducing intake of cholesterol  Exercise recommendations include moderate aerobic physical activity for 150 minutes/week and exercising 3-5 times per week

## 2021-04-14 NOTE — PATIENT INSTRUCTIONS
Obesity   AMBULATORY CARE:   Obesity  is when your body mass index (BMI) is greater than 30  Your healthcare provider will use your height and weight to measure your BMI  The risks of obesity include  many health problems, such as injuries or physical disability  You may need tests to check for the following:  · Diabetes    · High blood pressure or high cholesterol    · Heart disease    · Gallbladder or liver disease    · Cancer of the colon, breast, prostate, liver, or kidney    · Sleep apnea    · Arthritis or gout    Seek care immediately if:   · You have a severe headache, confusion, or difficulty speaking  · You have weakness on one side of your body  · You have chest pain, sweating, or shortness of breath  Contact your healthcare provider if:   · You have symptoms of gallbladder or liver disease, such as pain in your upper abdomen  · You have knee or hip pain and discomfort while walking  · You have symptoms of diabetes, such as intense hunger and thirst, and frequent urination  · You have symptoms of sleep apnea, such as snoring or daytime sleepiness  · You have questions or concerns about your condition or care  Treatment for obesity  focuses on helping you lose weight to improve your health  Even a small decrease in BMI can reduce the risk for many health problems  Your healthcare provider will help you set a weight-loss goal   · Lifestyle changes  are the first step in treating obesity  These include making healthy food choices and getting regular physical activity  Your healthcare provider may suggest a weight-loss program that involves coaching, education, and therapy  · Medicine  may help you lose weight when it is used with a healthy diet and physical activity  · Surgery  can help you lose weight if you are very obese and have other health problems  There are several types of weight-loss surgery  Ask your healthcare provider for more information      Be successful losing weight:   · Set small, realistic goals  An example of a small goal is to walk for 20 minutes 5 days a week  Anther goal is to lose 5% of your body weight  · Tell friends, family members, and coworkers about your goals  and ask for their support  Ask a friend to lose weight with you, or join a weight-loss support group  · Identify foods or triggers that may cause you to overeat , and find ways to avoid them  Remove tempting high-calorie foods from your home and workplace  Place a bowl of fresh fruit on your kitchen counter  If stress causes you to eat, then find other ways to cope with stress  · Keep a diary to track what you eat and drink  Also write down how many minutes of physical activity you do each day  Weigh yourself once a week and record it in your diary  Eating changes: You will need to eat 500 to 1,000 fewer calories each day than you currently eat to lose 1 to 2 pounds a week  The following changes will help you cut calories:  · Eat smaller portions  Use small plates, no larger than 9 inches in diameter  Fill your plate half full of fruits and vegetables  Measure your food using measuring cups until you know what a serving size looks like  · Eat 3 meals and 1 or 2 snacks each day  Plan your meals in advance  Luz Maria Génesis and eat at home most of the time  Eat slowly  Do not skip meals  Skipping meals can lead to overeating later in the day  This can make it harder for you to lose weight  Talk with a dietitian to help you make a meal plan and schedule that is right for you  · Eat fruits and vegetables at every meal   They are low in calories and high in fiber, which makes you feel full  Do not add butter, margarine, or cream sauce to vegetables  Use herbs to season steamed vegetables  · Eat less fat and fewer fried foods  Eat more baked or grilled chicken and fish  These protein sources are lower in calories and fat than red meat  Limit fast food   Dress your salads with olive oil and vinegar instead of bottled dressing  · Limit the amount of sugar you eat  Do not drink sugary beverages  Limit alcohol  Activity changes:  Physical activity is good for your body in many ways  It helps you burn calories and build strong muscles  It decreases stress and depression, and improves your mood  It can also help you sleep better  Talk to your healthcare provider before you begin an exercise program   · Exercise for at least 30 minutes 5 days a week  Start slowly  Set aside time each day for physical activity that you enjoy and that is convenient for you  It is best to do both weight training and an activity that increases your heart rate, such as walking, bicycling, or swimming  · Find ways to be more active  Do yard work and housecleaning  Walk up the stairs instead of using elevators  Spend your leisure time going to events that require walking, such as outdoor festivals or fairs  This extra physical activity can help you lose weight and keep it off  Follow up with your healthcare provider as directed: You may need to meet with a dietitian  Write down your questions so you remember to ask them during your visits  © Copyright [address] [place] [address] Drive Information is for End User's use only and may not be sold, redistributed or otherwise used for commercial purposes  All illustrations and images included in CareNotes® are the copyrighted property of A D A M , Inc  or [facility] Teresa Gautam   The above information is an  only  It is not intended as medical advice for individual conditions or treatments  Talk to your doctor, nurse or pharmacist before following any medical regimen to see if it is safe and effective for you  Weight Management   AMBULATORY CARE:   Why it is important to manage your weight:  Being overweight increases your risk of health conditions such as heart disease, high blood pressure, type 2 diabetes, and certain types of cancer   It can also increase your risk for osteoarthritis, sleep apnea, and other respiratory problems  Aim for a slow, steady weight loss  Even a small amount of weight loss can lower your risk of health problems  How to lose weight safely:  A safe and healthy way to lose weight is to eat fewer calories and get regular exercise  · You can lose up about 1 pound a week by decreasing the number of calories you eat by 500 calories each day  You can decrease calories by eating smaller portion sizes or by cutting out high-calorie foods  Read labels to find out how many calories are in the foods you eat  · You can also burn calories with exercise such as walking, swimming, or biking  You will be more likely to keep weight off if you make these changes part of your lifestyle  Exercise at least 30 minutes per day on most days of the week  You can also fit in more physical activity by taking the stairs instead of the elevator or parking farther away from stores  Ask your healthcare provider about the best exercise plan for you  Healthy meal plan for weight management:  A healthy meal plan includes a variety of foods, contains fewer calories, and helps you stay healthy  A healthy meal plan includes the following:     · Eat whole-grain foods more often  A healthy meal plan should contain fiber  Fiber is the part of grains, fruits, and vegetables that is not broken down by your body  Whole-grain foods are healthy and provide extra fiber in your diet  Some examples of whole-grain foods are whole-wheat breads and pastas, oatmeal, brown rice, and bulgur  · Eat a variety of vegetables every day  Include dark, leafy greens such as spinach, kale, jo greens, and mustard greens  Eat yellow and orange vegetables such as carrots, sweet potatoes, and winter squash  · Eat a variety of fruits every day  Choose fresh or canned fruit (canned in its own juice or light syrup) instead of juice  Fruit juice has very little or no fiber      · Eat low-fat dairy foods  Drink fat-free (skim) milk or 1% milk  Eat fat-free yogurt and low-fat cottage cheese  Try low-fat cheeses such as mozzarella and other reduced-fat cheeses  · Choose meat and other protein foods that are low in fat  Choose beans or other legumes such as split peas or lentils  Choose fish, skinless poultry (chicken or turkey), or lean cuts of red meat (beef or pork)  Before you cook meat or poultry, cut off any visible fat  · Use less fat and oil  Try baking foods instead of frying them  Add less fat, such as margarine, sour cream, regular salad dressing and mayonnaise to foods  Eat fewer high-fat foods  Some examples of high-fat foods include french fries, doughnuts, ice cream, and cakes  · Eat fewer sweets  Limit foods and drinks that are high in sugar  This includes candy, cookies, regular soda, and sweetened drinks  Ways to decrease calories:   · Eat smaller portions  ? Use a small plate with smaller servings  ? Do not eat second helpings  ? When you eat at a restaurant, ask for a box and place half of your meal in the box before you eat  ? Share an entrée with someone else  · Replace high-calorie snacks with healthy, low-calorie snacks  ? Choose fresh fruit, vegetables, fat-free rice cakes, or air-popped popcorn instead of potato chips, nuts, or chocolate  ? Choose water or calorie-free drinks instead of soda or sweetened drinks  · Do not shop for groceries when you are hungry  You may be more likely to make unhealthy food choices  Take a grocery list of healthy foods and shop after you have eaten  · Eat regular meals  Do not skip meals  Skipping meals can lead to overeating later in the day  This can make it harder for you to lose weight  Eat a healthy snack in place of a meal if you do not have time to eat a regular meal  Talk with a dietitian to help you create a meal plan and schedule that is right for you      Other things to consider as you try to lose weight:   · Be aware of situations that may give you the urge to overeat, such as eating while watching television  Find ways to avoid these situations  For example, read a book, go for a walk, or do crafts  · Meet with a weight loss support group or friends who are also trying to lose weight  This may help you stay motivated to continue working on your weight loss goals  © Copyright [address] Information is for End User's use only and may not be sold, redistributed or otherwise used for commercial purposes  All illustrations and images included in CareNotes® are the copyrighted property of [employer]  or [facility] Teresa Gautam   The above information is an  only  It is not intended as medical advice for individual conditions or treatments  Talk to your doctor, nurse or pharmacist before following any medical regimen to see if it is safe and effective for you

## 2021-04-30 ENCOUNTER — TELEPHONE (OUTPATIENT)
Dept: FAMILY MEDICINE CLINIC | Facility: CLINIC | Age: 56
End: 2021-04-30

## 2021-04-30 NOTE — TELEPHONE ENCOUNTER
Patient has a form for us to complete on line for [place] state disability  I am placing the information in your bin at [facility]  If you will not be in the office to complete please let me know I can scan to you or you can ask a colleague to complete  Let us know in the office how you can get this paperwork completed   Thank you

## 2021-05-17 ENCOUNTER — TELEPHONE (OUTPATIENT)
Dept: INTERNAL MEDICINE CLINIC | Facility: CLINIC | Age: 56
End: 2021-05-17

## 2021-05-17 DIAGNOSIS — I26.99 BILATERAL PULMONARY EMBOLISM (HCC): Primary | ICD-10-CM

## 2021-05-17 NOTE — TELEPHONE ENCOUNTER
Stan Antunez has called the office in regards to his [facility] paperwork  Kal Ayon states there was a misunderstanding within his [facility] paperwork  Kal Ayon states the [employer] paperwork states he can work, but with restrictions, when it should have been without restrictions  Narda Ramirez has an appointment scheduled for 05/21 and would like to discuss the [facility] paperwork during the visit

## 2021-05-17 NOTE — TELEPHONE ENCOUNTER
Calvin Monday has called the office in regards to his medications  Janet wished to go back from using Pradaxa 150 mg capsule to the rivaroxaban (XARELTO) 20 mg tablet  When asked for the reason Domingo Pimentel states he is getting the same symptoms he got when using eliquis  Domingo Pimentel expanded upon this by stating he was receiving muscle tremors, burning in his stomach, and a lack of an appetite  Janet stated the Neil Velazquez still gave him the same side effects ,yet they were not as pronounce as they are with the Pradaxa  If possible Codeyjazmin would like the medication to be sent to the [facility] on [address] [place] [address] Road

## 2021-05-18 NOTE — TELEPHONE ENCOUNTER
Called Janet to inform him of the xarelto medication being sent to the [facility] on [address]

## 2021-05-20 ENCOUNTER — HOSPITAL ENCOUNTER (OUTPATIENT)
Dept: NON INVASIVE DIAGNOSTICS | Facility: CLINIC | Age: 56
Discharge: HOME | End: 2021-05-20
Payer: COMMERCIAL

## 2021-05-20 DIAGNOSIS — I51.7 RIGHT VENTRICULAR DILATION: ICD-10-CM

## 2021-05-20 DIAGNOSIS — I26.99 BILATERAL PULMONARY EMBOLISM (HCC): ICD-10-CM

## 2021-05-20 PROBLEM — N17.9 AKI (ACUTE KIDNEY INJURY) (HCC): Status: RESOLVED | Noted: 2021-01-29 | Resolved: 2021-05-20

## 2021-05-20 PROBLEM — I25.9 CARDIAC ISCHEMIA: Status: RESOLVED | Noted: 2021-01-29 | Resolved: 2021-05-20

## 2021-05-20 PROBLEM — D72.829 LEUKOCYTOSIS: Status: RESOLVED | Noted: 2021-01-28 | Resolved: 2021-05-20

## 2021-05-20 PROBLEM — Z12.9 CANCER SCREENING: Status: RESOLVED | Noted: 2021-02-09 | Resolved: 2021-05-20

## 2021-05-20 PROBLEM — R77.8 ELEVATED TROPONIN: Status: RESOLVED | Noted: 2021-01-29 | Resolved: 2021-05-20

## 2021-05-20 PROBLEM — R73.9 HYPERGLYCEMIA: Status: RESOLVED | Noted: 2021-01-29 | Resolved: 2021-05-20

## 2021-05-20 PROBLEM — Z12.11 SCREEN FOR COLON CANCER: Status: RESOLVED | Noted: 2021-03-09 | Resolved: 2021-05-20

## 2021-05-20 PROCEDURE — 93308 TTE F-UP OR LMTD: CPT

## 2021-05-20 PROCEDURE — 93325 DOPPLER ECHO COLOR FLOW MAPG: CPT | Performed by: INTERNAL MEDICINE

## 2021-05-20 PROCEDURE — 93308 TTE F-UP OR LMTD: CPT | Performed by: INTERNAL MEDICINE

## 2021-05-20 PROCEDURE — 93321 DOPPLER ECHO F-UP/LMTD STD: CPT | Performed by: INTERNAL MEDICINE

## 2021-05-21 ENCOUNTER — OFFICE VISIT (OUTPATIENT)
Dept: INTERNAL MEDICINE CLINIC | Facility: CLINIC | Age: 56
End: 2021-05-21
Payer: COMMERCIAL

## 2021-05-21 VITALS
DIASTOLIC BLOOD PRESSURE: 80 MMHG | HEART RATE: 80 BPM | WEIGHT: 204.8 LBS | HEIGHT: 73 IN | OXYGEN SATURATION: 97 % | SYSTOLIC BLOOD PRESSURE: 128 MMHG | BODY MASS INDEX: 27.14 KG/M2 | TEMPERATURE: 98.8 F

## 2021-05-21 DIAGNOSIS — J30.2 SEASONAL ALLERGIES: ICD-10-CM

## 2021-05-21 DIAGNOSIS — I26.99 BILATERAL PULMONARY EMBOLISM (HCC): Primary | ICD-10-CM

## 2021-05-21 DIAGNOSIS — Z79.01 ANTICOAGULANT LONG-TERM USE: ICD-10-CM

## 2021-05-21 PROCEDURE — 99918 SL PR STATE DISABILITY FORM: CPT | Performed by: INTERNAL MEDICINE

## 2021-05-21 PROCEDURE — 3008F BODY MASS INDEX DOCD: CPT | Performed by: INTERNAL MEDICINE

## 2021-05-21 PROCEDURE — 99914: CPT | Performed by: INTERNAL MEDICINE

## 2021-05-21 PROCEDURE — 99213 OFFICE O/P EST LOW 20 MIN: CPT | Performed by: INTERNAL MEDICINE

## 2021-05-21 RX ORDER — CETIRIZINE HYDROCHLORIDE 10 MG/1
5 TABLET, CHEWABLE ORAL DAILY
COMMUNITY

## 2021-05-21 NOTE — ASSESSMENT & PLAN NOTE
Did not tolerate Pradaxa  Resumed Xarelto, and tolerating well  Echocardiogram done in May 20, 2021 resolved right heart strain and normal pulmonary artery pressure, normal EF of 60%  Recommended continue anticoagulation lifelong  Patient can return to work with no restriction at this time  Follow-up in 3 months after colonoscopy for cancer screening  Repeat thrombosis panel when off anticoagulation for colonoscopy

## 2021-05-21 NOTE — PROGRESS NOTES
Assessment/Plan:    Anticoagulant long-term use  Patient did not tolerate Eliquis and Pradaxa due to side effects  He is currently on Xarelto 20 mg daily and tolerating well  He is planned for a colonoscopy likely in July, recommended holding Xarelto for 5 days  And resume as soon as possible after procedure  He will also repeat his  Thrombosis panel when off of the anticoagulation, and he will call us so the blood work can be order at that time  Bilateral pulmonary embolism ([address] )   Did not tolerate Pradaxa  Resumed Xarelto, and tolerating well  Echocardiogram done in May 20, 2021 resolved right heart strain and normal pulmonary artery pressure, normal EF of 60%  Recommended continue anticoagulation lifelong  Patient can return to work with no restriction at this time  Follow-up in 3 months after colonoscopy for cancer screening  Repeat thrombosis panel when off anticoagulation for colonoscopy  Seasonal allergies   Well controlled on Zyrtec  Continue the same  Diagnoses and all orders for this visit:    Bilateral pulmonary embolism (HCC)    Anticoagulant long-term use    Seasonal allergies    Other orders  -     cetirizine (ZyrTEC) 10 MG chewable tablet; Chew 5 mg daily Takes half a tablet every 12 hours          Subjective:      Patient ID: Tevin Cade is a 61 y o  male  With a past medical history of bilateral pulmonary embolism with severe pulmonary hypertension and right heart strain who was admitted January 28, 2020 with above diagnosis and is currently on lifelong anticoagulation  He did try several oral anticoagulants in the past including Eliquis, Pradaxa and Xarelto  Currently he is on Xarelto, which has given him less side effects  He has not had any easy bruising, blood in urine or stools  He denies any shortness of breath, chest pain, palpitations, dizziness      His recent echocardiogram showed resolved right heart strain and normal pulmonary artery pressure  He could not return to work with restrictions, which we have established in April 2020    he is having some seasonal allergy symptoms with itchy/ sore throat, postnasal drip and sneezing which has been well controlled with over-the-counter Zyrtec  The following portions of the patient's history were reviewed and updated as appropriate: allergies, current medications, past family history, past medical history, past social history, past surgical history and problem list     Review of Systems   Constitutional: Negative for appetite change and fatigue  HENT: Positive for postnasal drip, sneezing and sore throat  Negative for congestion and trouble swallowing  Eyes: Negative for visual disturbance  Respiratory: Negative for cough, chest tightness, shortness of breath and wheezing  Cardiovascular: Negative for chest pain, palpitations and leg swelling  Gastrointestinal: Negative for abdominal pain, blood in stool, nausea and vomiting  Genitourinary: Negative for difficulty urinating, frequency and hematuria  Musculoskeletal: Negative for arthralgias and joint swelling  Skin: Negative for rash  Neurological: Negative for dizziness, tremors, weakness and headaches  Psychiatric/Behavioral: Negative for confusion and sleep disturbance  The patient is not nervous/anxious  Objective:      /80 (BP Location: Right arm, Patient Position: Sitting, Cuff Size: Standard)   Pulse 80   Temp 98 8 °F (37 1 °C) (Tympanic)   Ht 6' 1" (1 854 m)   Wt 92 9 kg (204 lb 12 8 oz)   SpO2 97%   BMI 27 02 kg/m²          Physical Exam  Vitals signs and nursing note reviewed  Constitutional:       Appearance: Normal appearance  He is well-developed  HENT:      Head: Normocephalic and atraumatic  Eyes:      Conjunctiva/sclera: Conjunctivae normal       Pupils: Pupils are equal, round, and reactive to light  Neck:      Musculoskeletal: Normal range of motion and neck supple  Thyroid: No thyromegaly  Cardiovascular:      Rate and Rhythm: Normal rate and regular rhythm  Heart sounds: Normal heart sounds  No murmur  Pulmonary:      Effort: Pulmonary effort is normal  No respiratory distress  Breath sounds: Normal breath sounds  No wheezing  Abdominal:      General: Bowel sounds are normal  There is no distension  Palpations: Abdomen is soft  Tenderness: There is no abdominal tenderness  Musculoskeletal: Normal range of motion  General: No swelling  Skin:     General: Skin is warm and dry  Capillary Refill: Capillary refill takes less than 2 seconds  Coloration: Skin is not pale  Neurological:      General: No focal deficit present  Mental Status: He is alert and oriented to person, place, and time  Sensory: No sensory deficit  Motor: No weakness or abnormal muscle tone  Psychiatric:         Mood and Affect: Mood normal          Thought Content:  Thought content normal          Judgment: Judgment normal

## 2021-05-21 NOTE — ASSESSMENT & PLAN NOTE
Patient did not tolerate Eliquis and Pradaxa due to side effects  He is currently on Xarelto 20 mg daily and tolerating well  He is planned for a colonoscopy likely in July, recommended holding Xarelto for 5 days  And resume as soon as possible after procedure  He will also repeat his  Thrombosis panel when off of the anticoagulation, and he will call us so the blood work can be order at that time

## 2021-06-07 ENCOUNTER — TELEPHONE (OUTPATIENT)
Dept: INTERNAL MEDICINE CLINIC | Facility: CLINIC | Age: 56
End: 2021-06-07

## 2021-06-07 NOTE — TELEPHONE ENCOUNTER
Geovany Fritz has arrived in the office in regards to Norm Moreno wished to discuss the [facility] paperwork regarding Lisa Mitchell  Tosha Moreno wished to know if Dr Eric Portillo could write a letter, to inform [employer] disability Lisa Mitchell returned to work 5/21/21, not 4/19/21  This is due to Lisa Mitchell being informed he could only work light duty work, but his employment did not have light duty work  Tosha Moreno requested the letter to simply stated Lisa Mitchell did not return to work until 5/21/21, not 4/19/21, due to his employment not having any light duty work

## 2021-10-18 ENCOUNTER — OFFICE VISIT (OUTPATIENT)
Dept: PULMONOLOGY | Facility: CLINIC | Age: 56
End: 2021-10-18
Payer: COMMERCIAL

## 2021-10-18 VITALS
DIASTOLIC BLOOD PRESSURE: 74 MMHG | OXYGEN SATURATION: 98 % | BODY MASS INDEX: 27.57 KG/M2 | WEIGHT: 208 LBS | TEMPERATURE: 98.1 F | SYSTOLIC BLOOD PRESSURE: 130 MMHG | HEIGHT: 73 IN | HEART RATE: 64 BPM

## 2021-10-18 DIAGNOSIS — I26.99 BILATERAL PULMONARY EMBOLISM (HCC): Primary | ICD-10-CM

## 2021-10-18 PROCEDURE — 1036F TOBACCO NON-USER: CPT | Performed by: INTERNAL MEDICINE

## 2021-10-18 PROCEDURE — 3008F BODY MASS INDEX DOCD: CPT | Performed by: INTERNAL MEDICINE

## 2021-10-18 PROCEDURE — 99213 OFFICE O/P EST LOW 20 MIN: CPT | Performed by: INTERNAL MEDICINE

## 2022-06-03 ENCOUNTER — TELEPHONE (OUTPATIENT)
Dept: PULMONOLOGY | Facility: CLINIC | Age: 57
End: 2022-06-03

## 2022-06-14 DIAGNOSIS — I26.99 BILATERAL PULMONARY EMBOLISM (HCC): ICD-10-CM

## 2022-06-20 RX ORDER — RIVAROXABAN 20 MG/1
TABLET, FILM COATED ORAL
Qty: 30 TABLET | Refills: 11 | Status: SHIPPED | OUTPATIENT
Start: 2022-06-20

## 2022-06-20 NOTE — TELEPHONE ENCOUNTER
Refill for Xarelto provided - this is for unprovoked Bilateral PE requiring lifelong AC  Needs a follow up

## 2022-06-20 NOTE — TELEPHONE ENCOUNTER
Zeferino Cardoza has requested a refill of xarelto 20 mg tablet  Pt does not meet the requirements placed by [facility]  Would a refill be appropriate?

## 2022-07-20 ENCOUNTER — TELEPHONE (OUTPATIENT)
Dept: PULMONOLOGY | Facility: CLINIC | Age: 57
End: 2022-07-20

## 2022-07-20 NOTE — TELEPHONE ENCOUNTER
Lm for pt to call back and sched 1 year f/u with Dr Petar Gerardo for October  Please schedule from the recall